# Patient Record
Sex: FEMALE | Race: WHITE
[De-identification: names, ages, dates, MRNs, and addresses within clinical notes are randomized per-mention and may not be internally consistent; named-entity substitution may affect disease eponyms.]

---

## 2017-03-08 ENCOUNTER — HOSPITAL ENCOUNTER (INPATIENT)
Dept: HOSPITAL 11 - JP.OBCHECK | Age: 32
Discharge: HOME | DRG: 560 | End: 2017-03-08
Attending: ADVANCED PRACTICE MIDWIFE | Admitting: ADVANCED PRACTICE MIDWIFE
Payer: COMMERCIAL

## 2017-03-08 VITALS — SYSTOLIC BLOOD PRESSURE: 133 MMHG | DIASTOLIC BLOOD PRESSURE: 76 MMHG

## 2017-03-08 DIAGNOSIS — O36.5990: ICD-10-CM

## 2017-03-08 DIAGNOSIS — O99.330: ICD-10-CM

## 2017-03-08 DIAGNOSIS — Z3A.36: ICD-10-CM

## 2017-03-08 DIAGNOSIS — O24.410: ICD-10-CM

## 2017-03-08 NOTE — PCM.DEL
L & D Note





- General Info


Date of Service: 17 (delivery)


Mother's Due Date: 17





- Delivery Note


Labor: spontaneous


Delivery Outcome: Livebirth


Infant Delivery Method: Spontaneous Vaginal Delivery


Birth Presentation: Vertex


Nuchal cord: none


Anesthesia Type: None


Amniotic Fluid Description: Clear


Episiotomy Type: None


Laceration: none


Placenta: intact, spontaneous


Cord: 3 vessels


Estimated blood loss: 100


Resuscitation needed: Yes


[place]: bulb syringe, stimulated, warmed, warmer used


 Provider: Daphne Green


APGAR Score 1 min: 6


APGAR Score 5 min: 8


APGAR Score 10 min: 8


Second Stage Interventions: Reports: Encouragement Given, Pushing Effectively


Delivery Comments (Free Text/Narrative):: 


This 31 year old G1 now P1 delivered a  viable female over an intact 

perineum at 1131 in ALOK position. The cord was clamped and cut and given to the 

pedestrian she was starting to cry at the time of delivery. weight 3.2 pounds 

1441 grams. three vessel cord. Apgars 6,8,8. PPV on and off times 1 minute, she 

actually transitioned well with normal for age O2 sats.


Placenta was expressed spontaneously intactmarjorie. No lacerations of cervix, 

vagina,rectum or perineum, placenta to pathology


EBL 100cc


Mother to post partum and baby to one to one nursing times 24 hours.


First stage [id number]


Second stage [id number]


Third stage [id number]








- General Info


Date of Service: 17


Admission Dx/Problem (Free Text): 


 Patient Status Order with Admit Dx/Problem





17 12:03


Patient Status [ADT] Routine 


   Patient Status: Admit to Inpatient


   Admission Diagnosis/Problem: Pregnancy


   Reason for Admit: labor


   Nurse Unit Type: Labor and Delivery


   Admitting Physician: Daphne Green


   Attending Physician: Daphne Green








 Admission Diagnosis/Problem











Admission Diagnosis/Problem    Pregnancy














Functional Status: Reports: pain controlled





- Review of Systems


General: Reports: no symptoms


HEENT: Reports: no symptoms


Pulmonary: Reports: no symptoms


Cardiovascular: Reports: no symptoms


Gastrointestinal: Reports: No symptoms


Genitourinary: Reports: no symptoms


Musculoskeletal: Reports: no symptoms


Skin: Reports: no symptoms


Neurological: Reports: no symptoms


Psychiatric: Reports: no symptoms





- Patient Data


Weight - most recent: 163 lb


Lab Results last 24 hrs: 


 Laboratory Results - last 24 hr











  17 Range/Units





  10:02 10:23 11:02 


 


WBC  15.3 H    (4.5-11.0)  K/uL


 


RBC  4.90    (3.30-5.50)  M/uL


 


Hgb  15.8 H    (12.0-15.0)  g/dL


 


Hct  45.1    (36.0-48.0)  %


 


MCV  92    (80-98)  fL


 


MCH  32 H    (27-31)  pg


 


MCHC  35    (32-36)  %


 


Plt Count  166    (150-400)  K/uL


 


Uric Acid    4.7  (2.6-6.2)  mg/dL


 


Total Bilirubin     (0.2-1.0)  mg/dL


 


Direct Bilirubin     (0.0-0.2)  mg/dL


 


Indirect Bilirubin     


 


AST     (15-37)  U/L


 


ALT     (12-78)  U/L


 


Alkaline Phosphatase     ()  U/L


 


Creatine Kinase    45  ()  U/L


 


Total Protein     (6.4-8.2)  g/dL


 


Albumin     (3.4-5.0)  g/dL


 


Globulin     (2.3-3.5)  g/dL


 


Albumin/Globulin Ratio     (1.2-2.2)  


 


Urine Color   Yellow   


 


Urine Appearance   Slightly cloudy   


 


Urine pH   7.0   (4.5-8.0)  


 


Ur Specific Gravity   1.010   (1.008-1.030)  


 


Urine Protein   Trace   (NEGATIVE)  mg/dL


 


Urine Glucose (UA)   Normal   (NEGATIVE)  mg/dL


 


Urine Ketones   Negative   (NEGATIVE)  mg/dL


 


Urine Occult Blood   Negative   (NEGATIVE)  


 


Urine Nitrite   Negative   (NEGATIVE)  


 


Urine Bilirubin   Negative   (NEGATIVE)  


 


Urine Urobilinogen   Normal   (NORMAL)  mg/dL


 


Ur Leukocyte Esterase   Negative   (NEGATIVE)  


 


Urine RBC   Not seen   (0-5)  


 


Urine WBC   Not seen   (0-5)  


 


Ur Epithelial Cells   Moderate   


 


Amorphous Sediment   Few   


 


Urine Bacteria   Few   


 


Urine Mucus   Moderate   














  17 Range/Units





  11:10 


 


WBC   (4.5-11.0)  K/uL


 


RBC   (3.30-5.50)  M/uL


 


Hgb   (12.0-15.0)  g/dL


 


Hct   (36.0-48.0)  %


 


MCV   (80-98)  fL


 


MCH   (27-31)  pg


 


MCHC   (32-36)  %


 


Plt Count   (150-400)  K/uL


 


Uric Acid   (2.6-6.2)  mg/dL


 


Total Bilirubin  0.2  (0.2-1.0)  mg/dL


 


Direct Bilirubin  0.08  (0.0-0.2)  mg/dL


 


Indirect Bilirubin  TNP  


 


AST  21  (15-37)  U/L


 


ALT  16  (12-78)  U/L


 


Alkaline Phosphatase  95  ()  U/L


 


Creatine Kinase   ()  U/L


 


Total Protein  6.7  (6.4-8.2)  g/dL


 


Albumin  3.0 L  (3.4-5.0)  g/dL


 


Globulin  3.7 H  (2.3-3.5)  g/dL


 


Albumin/Globulin Ratio  0.8 L  (1.2-2.2)  


 


Urine Color   


 


Urine Appearance   


 


Urine pH   (4.5-8.0)  


 


Ur Specific Gravity   (1.008-1.030)  


 


Urine Protein   (NEGATIVE)  mg/dL


 


Urine Glucose (UA)   (NEGATIVE)  mg/dL


 


Urine Ketones   (NEGATIVE)  mg/dL


 


Urine Occult Blood   (NEGATIVE)  


 


Urine Nitrite   (NEGATIVE)  


 


Urine Bilirubin   (NEGATIVE)  


 


Urine Urobilinogen   (NORMAL)  mg/dL


 


Ur Leukocyte Esterase   (NEGATIVE)  


 


Urine RBC   (0-5)  


 


Urine WBC   (0-5)  


 


Ur Epithelial Cells   


 


Amorphous Sediment   


 


Urine Bacteria   


 


Urine Mucus   











Med Orders - Current: 


 Current Medications





Acetaminophen (Tylenol)  650 mg PO Q4H PRN


   PRN Reason: mild pain or fever


Emollient Ointment (Lansinoh Hpa)  0 gm TOP ASDIRECTED PRN


   PRN Reason: Sore Nipples


Lactated Ringer's (Ringers, Lactated)  1,000 mls @ 100 mls/hr IV ASDIRECTED [facility]


   Last Admin: 17 10:43 Dose:  100 mls/hr


Penicillin G Potassium 2.5 (millunits/ Sodium Chloride)  50 mls @ 100 mls/hr IV 

Q4H [facility]


Oxytocin/Sodium Chloride (Pitocin In Ns 20 Units/1,000 Ml)  20 unit in 1,000 

mls @ 500 mls/hr IV ASDIRECTED [facility]


   PRN Reason: Protocol


Oxytocin/Sodium Chloride (Pitocin In Ns 20 Units/1,000 Ml)  20 unit in 1,000 

mls @ 999 mls/hr IV ONETIME ONE


   PRN Reason: Protocol


   Stop: 17 13:30


Ondansetron HCl (Zofran)  4 mg IVPUSH Q4H PRN


   PRN Reason: Nausea/Vomiting


   Last Admin: 17 10:42 Dose:  4 mg


Oxycodone/Acetaminophen (Percocet 325-5 Mg)  1 tab PO Q4H PRN


   PRN Reason: Pain (moderate 4-6)





Discontinued Medications





Penicillin G Potassium 5 (millunits/ Sodium Chloride)  100 mls @ 100 mls/hr IV 

ONETIME ONE


   Stop: 17 11:59


   Last Admin: 17 10:43 Dose:  100 mls/hr











- Exam


General: alert, oriented


HEENT: Pupils equal, Pupils reactive


Neck: supple


Lungs: Clear to auscultation, Normal respiratory effort


Cardiovascular: regular rate, regular rhythm


Abdomen: bowel sounds present, soft, no tenderness


 (Female) Exam: Normal external exam, Normal speculum exam


Back Exam: normal inspection, full range of motion


Extremities: no edema


Skin: warm, dry


Wound/Incisions: healing well


Neurological: no new focal deficit


Psy/Mental Status: alert, normal affect, normal mood





- Problem List & Annotations


(1) GDM (gestational diabetes mellitus), class A1


SNOMED Code(s): 40183537


   Code(s): O24.410 - GESTATIONAL DIABETES MELLITUS IN PREGNANCY, DIET 

CONTROLLED   Status: Acute   Current Visit: Yes   





(2) IUGR (intrauterine growth retardation) of 


SNOMED Code(s): 84998995, 20313920


   Code(s): P05.9 -  AFFECTED BY SLOW INTRAUTERINE GROWTH, UNSPECIFIED   

Status: Acute   Current Visit: No   





- Problem List Review


Problem List Initiated/Reviewed/Updated: Yes





- My Orders


Last 24 Hours: 


My Active Orders





17 09:40


OB Check [OM.PC] Click to Edit 





17 10:00


Lactated Ringers [Ringers, Lactated] 1,000 ml IV ASDIRECTED 





17 10:26


Ondansetron [Zofran]   4 mg IVPUSH Q4H PRN 





17 10:30


Oxytocin/Normal Saline [Pitocin in NS 20 Units/1,000 ML] 20 unit in 1,000 ml IV 

ASDIRECTED 





17 12:02


May Shower [RC] ASDIRECTED 


Up ad [place] [RC] ASDIRECTED 


Acetaminophen [Tylenol]   650 mg PO Q4H PRN 


Acetaminophen/oxyCODONE [Percocet 325-5 MG]   1 tab PO Q4H PRN 


Lanolin [Lansinoh HPA]   1 gm TOP ASDIRECTED PRN 


Assess Lochia [WOMSER] Per Unit Routine 


Assess Uterine Involution [WOMSER] Per Unit Routine 


DVT/VTE Prophylaxis Reflex [OM.PC] Routine 


Resuscitation Status Routine 





17 12:03


Patient Status [ADT] Routine 


Vital Signs [RC] PFP 





17 12:04


Ice Therapy [OM.PC] Per Unit Routine 


Perineal Care [OM.PC] Per Unit Routine 


Peripheral IV Discontinue [OM.PC] Routine 


Sitz Bath [OM.PC] Per Unit Routine 





17 12:05


Antiembolic Devices [RC] .Routine 


VTE/DVT Education [RC] Click to Edit 





17 12:09


Oxytocin/Normal Saline [Pitocin in NS 20 Units/1,000 ML] 20 unit in 1,000 ml IV 

ONETIME 





17 15:00


Penicillin G Potassium [Pfizerpen] 2.5 millunits   Sodium Chloride 0.9% [Normal 

Saline] 50 ml IV Q4H 





17 Lunch


Regular Diet [DIET] 





17 05:11


CBC WITH AUTO DIFF [HEME] AM 














- Assessment


Assessment:: 


3/8/17


This 31 year old G1 now P1   1141 gm female infant


IUGR


GDM


Smoker








- Plan


Plan:: 


Monitor for active labor


start pencillin for unknown GBS


Run IV LR at 100cc/hr


Draw PI labs


prepare for  delivery


---------------------------------------


3/8/17


Routine cares


Check am CBC


monitor blood pressures


may eat regular diet


48 hour stay, unknown GBS


support breastfeeding


ABO A pos


Rubella Immune

## 2017-03-08 NOTE — PCM.LDHP
L&D History of Present Illness





- General


Date of Service: 17 (labor)


Admit Problem/Dx: 


 Patient Status Order with Admit Dx/Problem





17 12:03


Patient Status [ADT] Routine 


   Patient Status: Admit to Inpatient


   Admission Diagnosis/Problem: Pregnancy


   Reason for Admit: labor


   Nurse Unit Type: Labor and Delivery


   Admitting Physician: Daphne Green


   Attending Physician: Daphne Green








 Admission Diagnosis/Problem











Admission Diagnosis/Problem    Pregnancy














Source of Information: Patient


History Limitations: Reports: No limitations





- History of Present Illness


Introduction:: 


This 31 year old G1 who is 36 1/7 weeks gestation present to clinic for follow 

up on BPP nad concerns for IUGR. Has GDM well controlled and is a smoker. 


Had been marie since early AM and contractions are painful. Ultrasound 

showed fetus to be in the 10% for growth measures 6 weeks less than gestational 

age. 


On cervical exam 375/0 marie every 3-4 monitor for labor.


Discussed case with Dr. Bolton and she will attend birth as needed.


GBS swab done at clinic. Will cover with antibiotics.





Timing/Duration: Reports: minutes: (3-4)


Location, Pregnancy: Reports: Lower back


Quality: Reports: Ache, Pressure


Severity: severe


Improves with: Reports: None


Worsens with: Reports: None





- Related Data


Allergies/Adverse Reactions: 


 Allergies











Allergy/AdvReac Type Severity Reaction Status Date / Time


 


No Known Allergies Allergy   Verified 17 09:37











Home Medications: 


 Home Meds





Cinnamon Bark [Cinnamon] 500 mg PO DAILY 17 [History]


Ergocalciferol (Vitamin D2) [Vitamin D] 400 unit PO DAILY 17 [History]


PNV95/Ferrous Fumarate/FA [Prenavite Tablet] 1 each PO DAILY 17 [History]











Past Medical History


OB/GYN History: Reports: Polycystic Ovaries, Pregnancy


: 1


Para: 0


LMP (Approximate): Pregnant (CECILIA 17)


Endocrine/Metabolic History: Reports: Diabetes, gestational





- Past Surgical History


HEENT Surgical History: Reports: Adenoidectomy, Tonsillectomy


Other Respiratory Surgeries/Procedures: smoker; 3-4 cigs/day





H&P Review of Systems





- Review of Systems:


Review Of Systems: See Below


General: Reports: no symptoms


HEENT: Reports: no symptoms


Pulmonary: Reports: no symptoms


Cardiovascular: Reports: no symptoms


Gastrointestinal: Reports: No symptoms, Other (blood pressure up and being 

monitored)


Genitourinary: Reports: no symptoms


Musculoskeletal: Reports: no symptoms


Skin: Reports: no symptoms


Psychiatric: Reports: no symptoms


Neurological: Reports: no symptoms


Hematologic/Lymphatic: Reports: no symptoms


Immunologic: Reports: no symptoms





L&D Exam





- Exam


Exam: See Below





- Vital Signs


Weight: 163 lb





- OB Specific


Contraction Intensity: Strong


Fetal movement: active


Fetal heart tones: present


Fetal heart tones per min: 140


Birth Presentation: Vertex


Estimated Fetal Weight: 3 pounds





- Justice Score


Justice Score Cervix Position: Anterior


Justice Score Consistency: Soft


Justice Score Effacement: 51-70%


Justice Score Dilation: 3-4 cm


Justice Score Infant's Station: -1 ,0


Justice Score Total: 10





- Exam


General: alert, oriented


HEENT: PERRLA, Conjunctiva clear, Mucosa moist & pink


Neck: supple


Lungs: Clear to auscultation, Normal respiratory effort


Cardiovascular: regular rate, regular rhythm


Abdomen: normal bowel sounds, soft


Rectal Exam: Normal exam


Genitourinary: Normal external exam


Back Exam: normal inspection, full range of motion


Extremities: normal inspection


Skin: warm, dry, intact


Neurological: cranial nerves intact, reflexes equal bilateral


Psychiatric: alert, normal affect, normal mood





- Patient Data


Lab Results last 24 hrs: 


 Laboratory Results - last 24 hr











  17 Range/Units





  10:02 10:23 11:02 


 


WBC  15.3 H    (4.5-11.0)  K/uL


 


RBC  4.90    (3.30-5.50)  M/uL


 


Hgb  15.8 H    (12.0-15.0)  g/dL


 


Hct  45.1    (36.0-48.0)  %


 


MCV  92    (80-98)  fL


 


MCH  32 H    (27-31)  pg


 


MCHC  35    (32-36)  %


 


Plt Count  166    (150-400)  K/uL


 


Uric Acid    4.7  (2.6-6.2)  mg/dL


 


Total Bilirubin     (0.2-1.0)  mg/dL


 


Direct Bilirubin     (0.0-0.2)  mg/dL


 


Indirect Bilirubin     


 


AST     (15-37)  U/L


 


ALT     (12-78)  U/L


 


Alkaline Phosphatase     ()  U/L


 


Creatine Kinase    45  ()  U/L


 


Total Protein     (6.4-8.2)  g/dL


 


Albumin     (3.4-5.0)  g/dL


 


Globulin     (2.3-3.5)  g/dL


 


Albumin/Globulin Ratio     (1.2-2.2)  


 


Urine Color   Yellow   


 


Urine Appearance   Slightly cloudy   


 


Urine pH   7.0   (4.5-8.0)  


 


Ur Specific Gravity   1.010   (1.008-1.030)  


 


Urine Protein   Trace   (NEGATIVE)  mg/dL


 


Urine Glucose (UA)   Normal   (NEGATIVE)  mg/dL


 


Urine Ketones   Negative   (NEGATIVE)  mg/dL


 


Urine Occult Blood   Negative   (NEGATIVE)  


 


Urine Nitrite   Negative   (NEGATIVE)  


 


Urine Bilirubin   Negative   (NEGATIVE)  


 


Urine Urobilinogen   Normal   (NORMAL)  mg/dL


 


Ur Leukocyte Esterase   Negative   (NEGATIVE)  


 


Urine RBC   Not seen   (0-5)  


 


Urine WBC   Not seen   (0-5)  


 


Ur Epithelial Cells   Moderate   


 


Amorphous Sediment   Few   


 


Urine Bacteria   Few   


 


Urine Mucus   Moderate   














  // Range/Units





  11:10 


 


WBC   (4.5-11.0)  K/uL


 


RBC   (3.30-5.50)  M/uL


 


Hgb   (12.0-15.0)  g/dL


 


Hct   (36.0-48.0)  %


 


MCV   (80-98)  fL


 


MCH   (27-31)  pg


 


MCHC   (32-36)  %


 


Plt Count   (150-400)  K/uL


 


Uric Acid   (2.6-6.2)  mg/dL


 


Total Bilirubin  0.2  (0.2-1.0)  mg/dL


 


Direct Bilirubin  0.08  (0.0-0.2)  mg/dL


 


Indirect Bilirubin  TNP  


 


AST  21  (15-37)  U/L


 


ALT  16  (12-78)  U/L


 


Alkaline Phosphatase  95  ()  U/L


 


Creatine Kinase   ()  U/L


 


Total Protein  6.7  (6.4-8.2)  g/dL


 


Albumin  3.0 L  (3.4-5.0)  g/dL


 


Globulin  3.7 H  (2.3-3.5)  g/dL


 


Albumin/Globulin Ratio  0.8 L  (1.2-2.2)  


 


Urine Color   


 


Urine Appearance   


 


Urine pH   (4.5-8.0)  


 


Ur Specific Gravity   (1.008-1.030)  


 


Urine Protein   (NEGATIVE)  mg/dL


 


Urine Glucose (UA)   (NEGATIVE)  mg/dL


 


Urine Ketones   (NEGATIVE)  mg/dL


 


Urine Occult Blood   (NEGATIVE)  


 


Urine Nitrite   (NEGATIVE)  


 


Urine Bilirubin   (NEGATIVE)  


 


Urine Urobilinogen   (NORMAL)  mg/dL


 


Ur Leukocyte Esterase   (NEGATIVE)  


 


Urine RBC   (0-5)  


 


Urine WBC   (0-5)  


 


Ur Epithelial Cells   


 


Amorphous Sediment   


 


Urine Bacteria   


 


Urine Mucus   











Result Diagrams: 


 17 10:02








- Problem List


(1) GDM (gestational diabetes mellitus), class A1


SNOMED Code(s): 69225987


   ICD Code: O24.410 - GESTATIONAL DIABETES MELLITUS IN PREGNANCY, DIET 

CONTROLLED   Status: Acute   Current Visit: Yes   





(2) IUGR (intrauterine growth retardation) of 


SNOMED Code(s): 76324555, 54932026


   ICD Code: P05.9 -  AFFECTED BY SLOW INTRAUTERINE GROWTH, UNSPECIFIED 

  Status: Acute   Current Visit: No   


Problem List Initiated/Reviewed/Updated: Yes


Orders Last 24hrs: 


 Active Orders 24 hr











 Category Date Time Status


 


 Patient Status [ADT] Routine ADT  17 12:03 Ordered


 


 Antiembolic Devices [RC] .Routine Care  17 12:05 Ordered


 


 May Shower [RC] ASDIRECTED Care  17 12:02 Ordered


 


 OB Check [OM.PC] Click to Edit Care  17 09:40 Ordered


 


 Up ad Esperanza [RC] ASDIRECTED Care  17 12:02 Ordered


 


 VTE/DVT Education [RC] Click to Edit Care  17 12:05 Ordered


 


 Vital Signs [RC] PFP Care  17 12:03 Ordered


 


 Regular Diet [DIET] Diet  17 Lunch Ordered


 


 CBC WITH AUTO DIFF [HEME] AM Lab  17 05:11 Ordered


 


 Acetaminophen [Tylenol] Med  17 12:02 Ordered





 650 mg PO Q4H PRN   


 


 Acetaminophen/oxyCODONE [Percocet 325-5 MG] Med  17 12:02 Ordered





 1 tab PO Q4H PRN   


 


 Lactated Ringers [Ringers, Lactated] 1,000 ml Med  17 10:00 Active





 IV ASDIRECTED   


 


 Lanolin [Lansinoh HPA] Med  17 12:02 Ordered





 1 gm TOP ASDIRECTED PRN   


 


 Ondansetron [Zofran] Med  17 10:26 Active





 4 mg IVPUSH Q4H PRN   


 


 Oxytocin/Normal Saline [Pitocin in NS 20 Units/1,000 ML Med  17 10:30 

Active





 ]   





 20 unit in 1,000 ml IV ASDIRECTED   


 


 Oxytocin/Normal Saline [Pitocin in NS 20 Units/1,000 ML Med  17 12:09 

Ordered





 ]   





 20 unit in 1,000 ml IV ONETIME   


 


 Penicillin G Potassium [Pfizerpen] 2.5 millunits Med  17 15:00 Active





 Sodium Chloride 0.9% [Normal Saline] 50 ml   





 IV Q4H   


 


 Assess Lochia [WOMSER] Per Unit Routine Ot  17 12:02 Ordered


 


 Assess Uterine Involution [WOMSER] Per Unit Routine Ot  17 12:02 Ordered


 


 DVT/VTE Prophylaxis Reflex [OM.PC] Routine Ot  17 12:02 Ordered


 


 Ice Therapy [OM.PC] Per Unit Routine Ot  17 12:04 Ordered


 


 Perineal Care [OM.PC] Per Unit Routine Ot  17 12:04 Ordered


 


 Peripheral IV Discontinue [OM.PC] Routine Ot  17 12:04 Ordered


 


 Sitz Bath [OM.PC] Per Unit Routine Ot  17 12:04 Ordered


 


 Resuscitation Status Routine Resus Stat  17 12:02 Ordered








 Medication Orders





Acetaminophen (Tylenol)  650 mg PO Q4H PRN


   PRN Reason: mild pain or fever


Emollient Ointment (Lansinoh Hpa)  1 gm TOP ASDIRECTED PRN


   PRN Reason: Sore Nipples


Lactated Ringer's (Ringers, Lactated)  1,000 mls @ 100 mls/hr IV ASDIRECTED [facility]


   Last Admin: 17 10:43  Dose: 100 mls/hr


Penicillin G Potassium 2.5 (millunits/ Sodium Chloride)  50 mls @ 100 mls/hr IV 

Q4H [facility]


Oxytocin/Sodium Chloride (Pitocin In Ns 20 Units/1,000 Ml)  20 unit in 1,000 

mls @ 500 mls/hr IV ASDIRECTED [facility]


   PRN Reason: Protocol


Oxytocin/Sodium Chloride (Pitocin In Ns 20 Units/1,000 Ml)  20 unit in 1,000 

mls @ 999 mls/hr IV ONETIME ONE


   PRN Reason: Protocol


   Stop: 17 13:09


Ondansetron HCl (Zofran)  4 mg IVPUSH Q4H PRN


   PRN Reason: Nausea/Vomiting


   Last Admin: 17 10:42  Dose: 4 mg


Oxycodone/Acetaminophen (Percocet 325-5 Mg)  1 tab PO Q4H PRN


   PRN Reason: Pain (moderate 4-6)








Assessment/Plan Comment:: 


Monitor for active labor


start pencillin for unknown GBS


Run IV LR at 100cc/hr


Draw [facility] labs


prepare for  delivery

## 2017-03-08 NOTE — PCM.PNPP
- General Info


Date of Service: 17 (discharge)


Admission Dx/Problem (Free Text): 


 Patient Status Order with Admit Dx/Problem





17 12:03


Patient Status [ADT] Routine 


   Patient Status: Admit to Inpatient


   Admission Diagnosis/Problem: Pregnancy


   Reason for Admit: labor


   Nurse Unit Type: Labor and Delivery


   Admitting Physician: Daphne Green


   Attending Physician: Daphne Green








 Admission Diagnosis/Problem











Admission Diagnosis/Problem    Pregnancy














Functional Status: Reports: pain controlled





- Review of Systems


General: Reports: no symptoms


HEENT: Reports: no symptoms


Pulmonary: Reports: no symptoms


Cardiovascular: Reports: no symptoms


Gastrointestinal: Reports: No symptoms


Genitourinary: Reports: no symptoms


Musculoskeletal: Reports: no symptoms


Skin: Reports: no symptoms


Neurological: Reports: no symptoms


Psychiatric: Reports: no symptoms





- General Info


Date of Service: 17





- Patient Data


Vital Signs - most recent: 


 Last Vital Signs











Temp  97.6 F   17 15:30


 


Pulse  84   17 15:30


 


Resp  16   17 15:30


 


BP  129/77   17 15:30


 


Pulse Ox  95   17 15:30











Weight - most recent: 163 lb


I&O - last 24 hours: 


 Intake & Output











 17





 06:59 14:59 22:59


 


Intake Total  1100 


 


Output Total   400


 


Balance  1100 -400











Lab Results - last 24 hrs: 


 Laboratory Results - last 24 hr











  17 Range/Units





  10:02 10:23 11:02 


 


WBC  15.3 H    (4.5-11.0)  K/uL


 


RBC  4.90    (3.30-5.50)  M/uL


 


Hgb  15.8 H    (12.0-15.0)  g/dL


 


Hct  45.1    (36.0-48.0)  %


 


MCV  92    (80-98)  fL


 


MCH  32 H    (27-31)  pg


 


MCHC  35    (32-36)  %


 


Plt Count  166    (150-400)  K/uL


 


Uric Acid    4.7  (2.6-6.2)  mg/dL


 


Total Bilirubin     (0.2-1.0)  mg/dL


 


Direct Bilirubin     (0.0-0.2)  mg/dL


 


Indirect Bilirubin     


 


AST     (15-37)  U/L


 


ALT     (12-78)  U/L


 


Alkaline Phosphatase     ()  U/L


 


Creatine Kinase    45  ()  U/L


 


Total Protein     (6.4-8.2)  g/dL


 


Albumin     (3.4-5.0)  g/dL


 


Globulin     (2.3-3.5)  g/dL


 


Albumin/Globulin Ratio     (1.2-2.2)  


 


Urine Color   Yellow   


 


Urine Appearance   Slightly cloudy   


 


Urine pH   7.0   (4.5-8.0)  


 


Ur Specific Gravity   1.010   (1.008-1.030)  


 


Urine Protein   Trace   (NEGATIVE)  mg/dL


 


Urine Glucose (UA)   Normal   (NEGATIVE)  mg/dL


 


Urine Ketones   Negative   (NEGATIVE)  mg/dL


 


Urine Occult Blood   Negative   (NEGATIVE)  


 


Urine Nitrite   Negative   (NEGATIVE)  


 


Urine Bilirubin   Negative   (NEGATIVE)  


 


Urine Urobilinogen   Normal   (NORMAL)  mg/dL


 


Ur Leukocyte Esterase   Negative   (NEGATIVE)  


 


Urine RBC   Not seen   (0-5)  


 


Urine WBC   Not seen   (0-5)  


 


Ur Epithelial Cells   Moderate   


 


Amorphous Sediment   Few   


 


Urine Bacteria   Few   


 


Urine Mucus   Moderate   


 


Urine Opiates Screen     (NEGATIVE)  


 


Ur Oxycodone Screen     (NEGATIVE)  


 


Urine Methadone Screen     (NEGATIVE)  


 


Ur Propoxyphene Screen     (NEGATIVE)  


 


Ur Barbiturates Screen     (NEGATIVE)  


 


Ur Tricyclics Screen     (NEGATIVE)  


 


Ur Phencyclidine Scrn     (NEGATIVE)  


 


Ur Amphetamine Screen     (NEGATIVE)  


 


U Methamphetamines Scrn     (NEGATIVE)  


 


Urine MDMA Screen     (NEGATIVE)  


 


U Benzodiazepines Scrn     (NEGATIVE)  


 


U Cocaine Metab Screen     (NEGATIVE)  


 


U Marijuana (THC) Screen     (NEGATIVE)  














  17 Range/Units





  11:10 12:33 


 


WBC    (4.5-11.0)  K/uL


 


RBC    (3.30-5.50)  M/uL


 


Hgb    (12.0-15.0)  g/dL


 


Hct    (36.0-48.0)  %


 


MCV    (80-98)  fL


 


MCH    (27-31)  pg


 


MCHC    (32-36)  %


 


Plt Count    (150-400)  K/uL


 


Uric Acid    (2.6-6.2)  mg/dL


 


Total Bilirubin  0.2   (0.2-1.0)  mg/dL


 


Direct Bilirubin  0.08   (0.0-0.2)  mg/dL


 


Indirect Bilirubin  TNP   


 


AST  21   (15-37)  U/L


 


ALT  16   (12-78)  U/L


 


Alkaline Phosphatase  95   ()  U/L


 


Creatine Kinase    ()  U/L


 


Total Protein  6.7   (6.4-8.2)  g/dL


 


Albumin  3.0 L   (3.4-5.0)  g/dL


 


Globulin  3.7 H   (2.3-3.5)  g/dL


 


Albumin/Globulin Ratio  0.8 L   (1.2-2.2)  


 


Urine Color    


 


Urine Appearance    


 


Urine pH    (4.5-8.0)  


 


Ur Specific Gravity    (1.008-1.030)  


 


Urine Protein    (NEGATIVE)  mg/dL


 


Urine Glucose (UA)    (NEGATIVE)  mg/dL


 


Urine Ketones    (NEGATIVE)  mg/dL


 


Urine Occult Blood    (NEGATIVE)  


 


Urine Nitrite    (NEGATIVE)  


 


Urine Bilirubin    (NEGATIVE)  


 


Urine Urobilinogen    (NORMAL)  mg/dL


 


Ur Leukocyte Esterase    (NEGATIVE)  


 


Urine RBC    (0-5)  


 


Urine WBC    (0-5)  


 


Ur Epithelial Cells    


 


Amorphous Sediment    


 


Urine Bacteria    


 


Urine Mucus    


 


Urine Opiates Screen   Negative  (NEGATIVE)  


 


Ur Oxycodone Screen   Negative  (NEGATIVE)  


 


Urine Methadone Screen   Negative  (NEGATIVE)  


 


Ur Propoxyphene Screen   Negative  (NEGATIVE)  


 


Ur Barbiturates Screen   Negative  (NEGATIVE)  


 


Ur Tricyclics Screen   Negative  (NEGATIVE)  


 


Ur Phencyclidine Scrn   Negative  (NEGATIVE)  


 


Ur Amphetamine Screen   Negative  (NEGATIVE)  


 


U Methamphetamines Scrn   Negative  (NEGATIVE)  


 


Urine MDMA Screen   Negative  (NEGATIVE)  


 


U Benzodiazepines Scrn   Negative  (NEGATIVE)  


 


U Cocaine Metab Screen   Negative  (NEGATIVE)  


 


U Marijuana (THC) Screen   Negative  (NEGATIVE)  











Med Orders - Current: 


 Current Medications





Acetaminophen (Tylenol)  650 mg PO Q4H PRN


   PRN Reason: mild pain or fever


   Last Admin: 17 15:49 Dose:  650 mg


Emollient Ointment (Lansinoh Hpa)  0 gm TOP ASDIRECTED PRN


   PRN Reason: Sore Nipples


Lactated Ringer's (Ringers, Lactated)  1,000 mls @ 100 mls/hr IV ASDIRECTED BE


   Last Admin: 17 10:43 Dose:  100 mls/hr


Oxytocin/Sodium Chloride (Pitocin In Ns 20 Units/1,000 Ml)  20 unit in 1,000 

mls @ 500 mls/hr IV ASDIRECTED BE


   PRN Reason: Protocol


Ondansetron HCl (Zofran)  4 mg IVPUSH Q4H PRN


   PRN Reason: Nausea/Vomiting


   Last Admin: 17 10:42 Dose:  4 mg


Oxycodone/Acetaminophen (Percocet 325-5 Mg)  1 tab PO Q4H PRN


   PRN Reason: Pain (moderate 4-6)





Discontinued Medications





Penicillin G Potassium 5 (millunits/ Sodium Chloride)  100 mls @ 100 mls/hr IV 

ONETIME ONE


   Stop: 17 11:59


   Last Admin: 17 10:43 Dose:  100 mls/hr


Penicillin G Potassium 2.5 (millunits/ Sodium Chloride)  50 mls @ 100 mls/hr IV 

Q4H [facility]


   Last Admin: 17 16:02 Dose:  Not Given


Oxytocin/Sodium Chloride (Pitocin In Ns 20 Units/1,000 Ml)  20 unit in 1,000 

mls @ 999 mls/hr IV ONETIME ONE


   PRN Reason: Protocol


   Stop: 17 13:30


   Last Admin: 17 11:33 Dose:  999 mls/hr, 999 mls/hr











- Infant Interaction


Infant Disposition, Postpartum: [place] in Room with Family


Infant Interaction: Holding Infant


Infant Feeding: Bottle Fed Infant,  Infant; Nursed Well


Support Person: Significant Other





- Postpartum Recovery Exam


Fundal Tone: Firm


Fundal Level: At Umbilicus


Fundal Placement: Right


Lochia Amount: Small


Lochia Color: Rubra/Red


Perineum Description: Intact, Minimal Bruising/Swelling


Episiotomy/Laceration: None


Bladder Status: Voiding


Urinary Elimination: Voided





- Exam


General: alert, oriented


HEENT: Pupils equal


Neck: supple


Lungs: Clear to auscultation, Normal respiratory effort


Cardiovascular: regular rate, regular rhythm


Abdomen: bowel sounds present, soft, no tenderness, no distension


Extremities: no edema


Skin: warm, dry, intact


Wound/Incisions: healing well


Neurological: no new focal deficit


Psy/Mental Status: alert, normal affect, normal mood





- Problem List & Annotations


(1) GDM (gestational diabetes mellitus), class A1


SNOMED Code(s): 36761565


   Code(s): O24.410 - GESTATIONAL DIABETES MELLITUS IN PREGNANCY, DIET 

CONTROLLED   Status: Acute   Current Visit: Yes   





(2) IUGR (intrauterine growth retardation) of 


SNOMED Code(s): 11619676, 81608821


   Code(s): P05.9 -  AFFECTED BY SLOW INTRAUTERINE GROWTH, UNSPECIFIED   

Status: Acute   Current Visit: No   





- Problem List Review


Problem List Initiated/Reviewed/Updated: Yes





- My Orders


Last 24 Hours: 


My Active Orders





17 09:40


OB Check [OM.PC] Click To Edit 





17 10:00


Lactated Ringers [Ringers, Lactated] 1,000 ml IV ASDIRECTED 





17 10:26


Ondansetron [Zofran]   4 mg IVPUSH Q4H PRN 





17 10:30


Oxytocin/Normal Saline [Pitocin in NS 20 Units/1,000 ML] 20 unit in 1,000 ml IV 

ASDIRECTED 





17 12:02


May Shower [RC] ASDIRECTED 


Up ad [place] [RC] ASDIRECTED 


Acetaminophen [Tylenol]   650 mg PO Q4H PRN 


Acetaminophen/oxyCODONE [Percocet 325-5 MG]   1 tab PO Q4H PRN 


Lanolin [Lansinoh HPA]   0 gm TOP ASDIRECTED PRN 


Assess Lochia [WOMSER] Per Unit Routine 


Assess Uterine Involution [WOMSER] Per Unit Routine 


DVT/VTE Prophylaxis Reflex [OM.PC] Routine 


Resuscitation Status Routine 





17 12:03


Patient Status [ADT] Routine 


Vital Signs [RC] PFP 





17 12:04


Ice Therapy [OM.PC] Per Unit Routine 


Perineal Care [OM.PC] Per Unit Routine 


Peripheral IV Discontinue [OM.PC] Routine 


Sitz Bath [OM.PC] Per Unit Routine 





17 12:05


Antiembolic Devices [RC] .Routine 





17 Lunch


Regular Diet [DIET] 





17 05:11


CBC WITH AUTO DIFF [HEME] AM 














- Assessment


Assessment:: 


3/8/17


This 31 year old G1 now P1   1141 gm female infant


IUGR


GDM


Smoker


---------------------------------------


3/8/17


 for  delivery at 36 weeks for IUGR infant


Breast and bottle feeding








- Plan


Plan:: 


Monitor for active labor


start pencillin for unknown GBS


Run IV LR at 100cc/hr


Draw PI labs


prepare for  delivery


---------------------------------------


3/8/17


Routine cares


Check am CBC


monitor blood pressures


may eat regular diet


48 hour stay, unknown GBS


support breastfeeding


ABO A pos


Rubella Immune


--------------------------------


Discharge mom so she can be with her baby in [place]


She is stable and doing well


Blood pressures are normal. no fever, bleeding light


To see me in 6 weeks for a post partum visit

## 2017-09-01 ENCOUNTER — HOSPITAL ENCOUNTER (EMERGENCY)
Dept: HOSPITAL 11 - JP.ED | Age: 32
Discharge: HOME | End: 2017-09-01
Payer: COMMERCIAL

## 2017-09-01 VITALS — SYSTOLIC BLOOD PRESSURE: 150 MMHG | DIASTOLIC BLOOD PRESSURE: 94 MMHG
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
## 2017-09-01 DIAGNOSIS — Z79.899: ICD-10-CM

## 2017-09-01 DIAGNOSIS — Z98.890: ICD-10-CM

## 2017-09-01 DIAGNOSIS — S39.012A: Primary | ICD-10-CM

## 2017-09-01 DIAGNOSIS — F17.210: ICD-10-CM

## 2017-09-01 PROCEDURE — 96372 THER/PROPH/DIAG INJ SC/IM: CPT

## 2017-09-01 PROCEDURE — 99283 EMERGENCY DEPT VISIT LOW MDM: CPT

## 2017-09-01 NOTE — EDM.PDOC
ED HPI GENERAL MEDICAL PROBLEM





- General


Chief Complaint: Back Pain or Injury


Stated Complaint: WORK INJURY


Time Seen by Provider: 17 20:38


Source of Information: Reports: Patient





- History of Present Illness


INITIAL COMMENTS - FREE TEXT/NARRATIVE: 





Was helping move a patient in bed when he pulled her arm, twisting her back.  

Was working up on 2nd floor.  Incident occurred about 4:30pm.  Has not taken 

anything for pain. Rates pain a 6 to low back with radiation down to left leg 

to knee at the time of exam.  Also with shoulder tightness on the left side.  

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  No 

history of injury to back.  Does have a 5 month old at home.  Is not 

breastfeeding.


Onset: Today, Sudden


Onset Date: 17


Onset Time: 16:30


Duration: Constant


Location: Reports: Back


Quality: Reports: Ache, Burning


Severity: Moderate


Improves with: Reports: Rest


Worsens with: Reports: None


Context: Reports: Lifting


Associated Symptoms: Reports: No Other Symptoms


  ** Left Lower Back


Pain Score (Numeric/FACES): 6





- Related Data


 Allergies











Allergy/AdvReac Type Severity Reaction Status Date / Time


 


No Known Allergies Allergy   Verified 17 20:24











Home Meds: 


 Home Meds





*Bc Pills 1 tab PO DAILY 17 [History]











Past Medical History


OB/GYN History: Reports: Polycystic Ovaries, Pregnancy, Spontaneous 


Musculoskeletal History: Reports: Fracture


Other Musculoskeletal History: Bilateral arms


Endocrine/Metabolic History: Reports: Diabetes, Gestational





- Infectious Disease History


Infectious Disease History: Reports: Chicken Pox





- Past Surgical History


HEENT Surgical History: Reports: Adenoidectomy, Tonsillectomy


Other Respiratory Surgeries/Procedures: smoker; 3-4 cigs/day





Social & Family History





- Tobacco Use


Smoking Status *Q: Current Every Day Smoker


Years of Tobacco use: 10


Packs/Tins Daily: 0.5


Used Tobacco, but Quit: No


Second Hand Smoke Exposure: Yes





- Caffeine Use


Caffeine Use: Reports: Coffee, Soda, Tea


Other Caffeine Use: very little





- Recreational Drug Use


Recreational Drug Use: No





ED ROS GENERAL





- Review of Systems


Review Of Systems: See Below


Constitutional: Reports: No Symptoms


HEENT: Reports: No Symptoms


Respiratory: Reports: No Symptoms


Cardiovascular: Reports: No Symptoms


Musculoskeletal: Reports: Shoulder Pain, Back Pain


Skin: Reports: No Symptoms


Neurological: Reports: No Symptoms





ED EXAM,LOWER BACK PAIN/INJURY





- Physical Exam


Exam: See Below


Exam Limited By: No Limitations


General Appearance: Alert, WD/WN, No Apparent Distress


Respiratory/Chest: No Respiratory Distress, Lungs Clear, Normal Breath Sounds, 

No Accessory Muscle Use, Chest Non-Tender


Cardiovascular: Normal Peripheral Pulses, Regular Rate, Rhythm, No Edema, No 

Gallop, No JVD, No Murmur, No Rub


GI/Abdominal: Normal Bowel Sounds, Soft, Non-Tender, No Organomegaly, No 

Distention, No Abnormal Bruit, No Mass


Extremities: Normal Inspection, Normal Range of Motion, Non-Tender, No Pedal 

Edema, Normal Capillary Refill, Other (pain with palpation to left SI region.  )


Neurological: Alert, Normal Mood/Affect, Normal Dorsiflexion, CN II-XII Intact, 

Normal Plantar Flexion, Normal Gait, Normal Reflexes, No Motor/Sensory Deficits

, Oriented x 3, Straight Leg Raise (L) (normal), Straight Leg Raise (R)


Skin Exam: Warm, Dry, Intact, Normal Color, No Rash


Lymphatic: No Adenopathy





Course





- Vital Signs


Last Recorded V/S: 





 Last Vital Signs











Temp  97.7 F   17 20:33


 


Pulse  83   17 20:33


 


Resp  16   17 20:33


 


BP  150/94 H  17 20:33


 


Pulse Ox  97   17 20:33














Departure





- Departure


Time of Disposition: 20:50


Disposition: Home, Self-Care 01


Condition: Good


Clinical Impression: 


Back strain


Qualifiers:


 Encounter type: initial encounter Qualified Code(s): S39.012A - Strain of 

muscle, fascia and tendon of lower back, initial encounter








- Discharge Information


Instructions:  Muscle Strain, Easy-to-Read


Referrals: 


Daphne Green CNM [Primary Care Provider] - 


Additional Instructions: 


Discussed stretching exercises.  No need for xrays at this time.  Encouraged 

ice to back.  Ketorolac 60mg IM given in ER.  Pt to use Aleve 1-2 tabs with 

food twice daily as needed for inflammation.  Flexeril 10mg po TID as needed 

for pain/spasm.  Dose given in ER.  May return to work on Tuesday without 

restriction.  To return if pain not resolving.





- Problem List & Annotations


(1) Back strain


SNOMED Code(s): 955974663


   Code(s): S39.012A - STRAIN OF MUSCLE, FASCIA AND TENDON OF LOWER BACK, INIT 

  Status: Acute   Priority: Medium   Current Visit: Yes   


Qualifiers: 


   Encounter type: initial encounter   Qualified Code(s): S39.012A - Strain of 

muscle, fascia and tendon of lower back, initial encounter

## 2019-08-13 ENCOUNTER — HOSPITAL ENCOUNTER (OUTPATIENT)
Dept: HOSPITAL 11 - JP.OBCHECK | Age: 34
Setting detail: OBSERVATION
LOS: 1 days | Discharge: HOME | End: 2019-08-14
Attending: ADVANCED PRACTICE MIDWIFE | Admitting: ADVANCED PRACTICE MIDWIFE
Payer: COMMERCIAL

## 2019-08-13 DIAGNOSIS — F41.9: ICD-10-CM

## 2019-08-13 DIAGNOSIS — R03.0: ICD-10-CM

## 2019-08-13 DIAGNOSIS — O99.343: Primary | ICD-10-CM

## 2019-08-13 DIAGNOSIS — Z79.84: ICD-10-CM

## 2019-08-13 DIAGNOSIS — O24.410: ICD-10-CM

## 2019-08-13 DIAGNOSIS — Z3A.36: ICD-10-CM

## 2019-08-13 DIAGNOSIS — O09.219: ICD-10-CM

## 2019-08-13 PROCEDURE — 85025 COMPLETE CBC W/AUTO DIFF WBC: CPT

## 2019-08-13 PROCEDURE — 81001 URINALYSIS AUTO W/SCOPE: CPT

## 2019-08-13 PROCEDURE — 76818 FETAL BIOPHYS PROFILE W/NST: CPT

## 2019-08-13 PROCEDURE — 85027 COMPLETE CBC AUTOMATED: CPT

## 2019-08-13 PROCEDURE — 83615 LACTATE (LD) (LDH) ENZYME: CPT

## 2019-08-13 PROCEDURE — 36415 COLL VENOUS BLD VENIPUNCTURE: CPT

## 2019-08-13 PROCEDURE — 87081 CULTURE SCREEN ONLY: CPT

## 2019-08-13 PROCEDURE — 80053 COMPREHEN METABOLIC PANEL: CPT

## 2019-08-13 PROCEDURE — 96360 HYDRATION IV INFUSION INIT: CPT

## 2019-08-13 PROCEDURE — 76816 OB US FOLLOW-UP PER FETUS: CPT

## 2019-08-13 PROCEDURE — G0378 HOSPITAL OBSERVATION PER HR: HCPCS

## 2019-08-14 VITALS — HEART RATE: 81 BPM | DIASTOLIC BLOOD PRESSURE: 85 MMHG | SYSTOLIC BLOOD PRESSURE: 133 MMHG

## 2019-08-14 NOTE — CRLUS
INDICATION:



Evaluate fetal anatomy.  



COMPARISON:



none



TECHNIQUE:



Real time gray scale imaging of the fetus was performed as well as color 

Doppler analysis of the umbilical vessels.



FINDINGS:



Sonographic imaging demonstrates a single living intrauterine gestation.  

Fetus demonstrates a regular cardiac rate of 150 beats per minute.  Fetus 

has a cephalic orientation.  Amniotic fluid volume appears normal.  Single 

deepest vertical pocket: 3.5 cm with amniotic fluid index 10.88. The 

composite ultrasound gestational age is calculated at 35 weeks 1 day with 

an estimated sonographic due date of 09/17/2019. This is approximately 7 

days behind the due date by LMP which is 09/10/2019. The estimated fetal 

weight is 2555 grams.



The following biometric measurements were obtained:



Biparietal diameter:          8.63 cm/34 weeks 6 days 



Head circumference:         31.37 cm/35 weeks 2 days 



Abdominal circumference: 31.02 cm/35 weeks 0 days 



Femur length:                  6.79 cm/35 weeks 0 days 



The HC/AC ratio measures: 1.01 range (0.93-1.11) 



Limited fetal survey demonstrates a four-chamber heart is present. The 

stomach, kidneys, and bladder also appear normal.   



The fetus was active and demonstrated normal breathing movements. There was 

normal flexion and extension of the trunk and extremities. The fetus scored 

2 out of 2 in every category. 



IMPRESSION:



1. Normal OB ultrasound exam with near concordance of clinical and 

sonographic dating.  No intrinsic abnormalities noted on limited anatomic 

survey. The composite ultrasound gestational age is 35 weeks 1 day and 

estimated sonographic due date is 09/17/2019. 



2. Normal biophysical profile score 8 out of 8.



Dictated by Maggie Decker MD @ Aug 14 2019  9:06AM



Signed by Dr. Maggie Decker @ Aug 14 2019  9:25AM

## 2019-08-14 NOTE — PCM.LDHP
L&D History of Present Illness





- General


Date of Service: 19


Admit Problem/Dx: 


 Patient Status Order with Admit Dx/Problem





19 22:27


Admission Status [Patient Status] [ADT] Routine 








 Admission Diagnosis/Problem











Admission Diagnosis/Problem    Leukocytosis

















- Related Data


Allergies/Adverse Reactions: 


 Allergies











Allergy/AdvReac Type Severity Reaction Status Date / Time


 


No Known Allergies Allergy   Verified 17 20:24











Home Medications: 


 Home Meds





Prenat Vit Comb.10/Iron/Fa/Dha [Vitafol-OB + DHA] 1 each PO DAILY 19 [

History]


glyBURIDE [Micronase] 2.5 mg PO DAILY 19 [History]











Past Medical History


OB/GYN History: Reports: Polycystic Ovaries, Pregnancy, Spontaneous 


Musculoskeletal History: Reports: Fracture


Other Musculoskeletal History: Bilateral arms


Endocrine/Metabolic History: Reports: Diabetes, Gestational





- Infectious Disease History


Infectious Disease History: Reports: Chicken Pox





- Past Surgical History


HEENT Surgical History: Reports: Adenoidectomy, Tonsillectomy


Other Respiratory Surgeries/Procedures: smoker; 3-4 cigs/day





Social & Family History





- Caffeine Use


Caffeine Use: Reports: Coffee, Soda, Tea


Other Caffeine Use: very little





H&P Review of Systems





- Review of Systems:


Review Of Systems: See Below


General: Reports: No Symptoms


HEENT: Reports: No Symptoms


Pulmonary: Reports: No Symptoms


Cardiovascular: Reports: No Symptoms


Gastrointestinal: Reports: No Symptoms


Genitourinary: Reports: No Symptoms


Musculoskeletal: Reports: No Symptoms


Skin: Reports: No Symptoms


Psychiatric: Reports: No Symptoms


Neurological: Reports: No Symptoms


Hematologic/Lymphatic: Reports: No Symptoms


Immunologic: Reports: No Symptoms





L&D Exam





- Exam


Exam: See Below





- Vital Signs


Vital Signs: 


 Last Vital Signs











Temp  35.3 C   19 07:29


 


Pulse  81   19 07:29


 


Resp  16   19 07:29


 


BP  133/85   19 07:29


 


Pulse Ox  99   19 07:29














- OB Specific


Contraction Duration (sec): 40


Contraction Frequency (min): none


Contraction Intensity: Mild





- Exam


General: Alert, Oriented, Cooperative


HEENT: PERRLA, Conjunctiva Clear, EACs Clear, EOMI, Hearing Intact, Mucosa 

Moist & Pink, Nares Patent, Normal Nasal Septum, Posterior Pharynx Clear, TMs 

Clear


Neck: Supple, Trachea Midline


Lungs: Clear to Auscultation, Normal Respiratory Effort


Cardiovascular: Regular Rate, Regular Rhythm


GI/Abdominal Exam: Normal Bowel Sounds, Soft, Non-Tender, No Organomegaly, No 

Distention, No Abnormal Bruit, No Mass, Pelvis Stable


Rectal Exam: Normal Exam, Normal Rectal Tone


Genitourinary: Normal external exam, Normal bimanual exam, Normal speculum exam


Back Exam: Normal Inspection, Full Range of Motion


Extremities: Normal Inspection, Normal Range of Motion, Non-Tender, No Pedal 

Edema, Normal Capillary Refill


Skin: Warm, Dry, Intact


Neurological: Cranial Nerves Intact, Reflexes Equal Bilateral


Psychiatric: Alert, Normal Affect, Normal Mood





- Patient Data


Lab Results Last 24 hrs: 


 Laboratory Results - last 24 hr











  19 Range/Units





  21:02 21:20 21:40 


 


WBC   18.1 H  17.7 H  (4.5-11.0)  K/uL


 


RBC   4.53  4.65  (3.30-5.50)  M/uL


 


Hgb   14.5  14.4  (12.0-15.0)  g/dL


 


Hct   41.5  42.4  (36.0-48.0)  %


 


MCV   92  91  (80-98)  fL


 


MCH   32 H  31  (27-31)  pg


 


MCHC   35  34  (32-36)  %


 


Plt Count   151  159  (150-400)  K/uL


 


Neut % (Auto)   81 H   (36-66)  %


 


Lymph % (Auto)   9 L   (24-44)  %


 


Mono % (Auto)   9 H   (2-6)  %


 


Eos % (Auto)   1 L   (2-4)  %


 


Baso % (Auto)   0   (0-1)  %


 


Sodium     (140-148)  mmol/L


 


Potassium     (3.6-5.2)  mmol/L


 


Chloride     (100-108)  mmol/L


 


Carbon Dioxide     (21-32)  mmol/L


 


Anion Gap     (5.0-14.0)  mmol/L


 


BUN     (7-18)  mg/dL


 


Creatinine     (0.6-1.0)  mg/dL


 


Est Cr Clr Drug Dosing     


 


Estimated GFR (MDRD)     (>60)  


 


Glucose     ()  mg/dL


 


Calcium     (8.5-10.1)  mg/dL


 


Total Bilirubin     (0.2-1.0)  mg/dL


 


AST     (15-37)  U/L


 


ALT     (12-78)  U/L


 


Alkaline Phosphatase     ()  U/L


 


Lactate Dehydrogenase     ()  U/L


 


Total Protein     (6.4-8.2)  g/dL


 


Albumin     (3.4-5.0)  g/dL


 


Globulin     (2.3-3.5)  g/dL


 


Albumin/Globulin Ratio     (1.2-2.2)  


 


Urine Color  Yellow    (YELLOW)  


 


Urine Appearance  Clear    (CLEAR)  


 


Urine pH  7.0    (5.0-8.0)  


 


Ur Specific Gravity  1.015    (1.008-1.030)  


 


Urine Protein  Negative    (NEGATIVE)  mg/dL


 


Urine Glucose (UA)  Normal    (NEGATIVE)  mg/dL


 


Urine Ketones  Negative    (NEGATIVE)  mg/dL


 


Urine Occult Blood  Negative    (NEGATIVE)  


 


Urine Nitrite  Negative    (NEGATIVE)  


 


Urine Bilirubin  Negative    (NEGATIVE)  


 


Urine Urobilinogen  0.2    (0.2-1.0)  EU/dL


 


Ur Leukocyte Esterase  Negative    (NEGATIVE)  


 


Urine RBC  Not seen    (0-5)  


 


Urine WBC  0-5    (0-5)  


 


Ur Epithelial Cells  Rare    


 


Amorphous Sediment  Not seen    


 


Urine Bacteria  Not seen    


 


Urine Mucus  Not seen    














  19 Range/Units





  21:40 05:00 


 


WBC   14.7 H  (4.5-11.0)  K/uL


 


RBC   4.19  (3.30-5.50)  M/uL


 


Hgb   13.0  (12.0-15.0)  g/dL


 


Hct   38.9  (36.0-48.0)  %


 


MCV   93  (80-98)  fL


 


MCH   31  (27-31)  pg


 


MCHC   33  (32-36)  %


 


Plt Count   142 L  (150-400)  K/uL


 


Neut % (Auto)   75 H  (36-66)  %


 


Lymph % (Auto)   16 L  (24-44)  %


 


Mono % (Auto)   9 H  (2-6)  %


 


Eos % (Auto)   0 L  (2-4)  %


 


Baso % (Auto)   0  (0-1)  %


 


Sodium  141   (140-148)  mmol/L


 


Potassium  3.4 L   (3.6-5.2)  mmol/L


 


Chloride  105   (100-108)  mmol/L


 


Carbon Dioxide  25   (21-32)  mmol/L


 


Anion Gap  14.4 H   (5.0-14.0)  mmol/L


 


BUN  9   (7-18)  mg/dL


 


Creatinine  0.8   (0.6-1.0)  mg/dL


 


Est Cr Clr Drug Dosing  TNP   


 


Estimated GFR (MDRD)  > 60   (>60)  


 


Glucose  87   ()  mg/dL


 


Calcium  11.0 H   (8.5-10.1)  mg/dL


 


Total Bilirubin  0.4  D   (0.2-1.0)  mg/dL


 


AST  18   (15-37)  U/L


 


ALT  16   (12-78)  U/L


 


Alkaline Phosphatase  94   ()  U/L


 


Lactate Dehydrogenase  169   ()  U/L


 


Total Protein  7.2   (6.4-8.2)  g/dL


 


Albumin  3.0 L   (3.4-5.0)  g/dL


 


Globulin  4.2 H   (2.3-3.5)  g/dL


 


Albumin/Globulin Ratio  0.7 L   (1.2-2.2)  


 


Urine Color    (YELLOW)  


 


Urine Appearance    (CLEAR)  


 


Urine pH    (5.0-8.0)  


 


Ur Specific Gravity    (1.008-1.030)  


 


Urine Protein    (NEGATIVE)  mg/dL


 


Urine Glucose (UA)    (NEGATIVE)  mg/dL


 


Urine Ketones    (NEGATIVE)  mg/dL


 


Urine Occult Blood    (NEGATIVE)  


 


Urine Nitrite    (NEGATIVE)  


 


Urine Bilirubin    (NEGATIVE)  


 


Urine Urobilinogen    (0.2-1.0)  EU/dL


 


Ur Leukocyte Esterase    (NEGATIVE)  


 


Urine RBC    (0-5)  


 


Urine WBC    (0-5)  


 


Ur Epithelial Cells    


 


Amorphous Sediment    


 


Urine Bacteria    


 


Urine Mucus    











Result Diagrams: 


 19 05:00





 19 21:40





- Problem List


(1) Anxiety


SNOMED Code(s): 91453023


   ICD Code: F41.9 - ANXIETY DISORDER, UNSPECIFIED   Status: Acute   Current 

Visit: Yes   





(2) 36 weeks gestation of pregnancy


SNOMED Code(s): 27507798


   ICD Code: Z3A.36 - 36 WEEKS GESTATION OF PREGNANCY   Status: Acute   Current 

Visit: Yes   





(3) History of  delivery, currently pregnant


SNOMED Code(s): 206300424, 236624888


   ICD Code: O09.219 - SUPRVSN OF PREG W HISTORY OF PRE-TERM LABOR, UNSP 

TRIMESTER   Status: Acute   Current Visit: Yes   





(4) Elevated BP without diagnosis of hypertension


SNOMED Code(s): 135070102


   ICD Code: R03.0 - ELEVATED BLOOD-PRESSURE READING, W/O DIAGNOSIS OF HTN   

Status: Acute   Current Visit: Yes   





(5) GDM (gestational diabetes mellitus), class A1


SNOMED Code(s): 61532201


   ICD Code: O24.410 - GESTATIONAL DIABETES MELLITUS IN PREGNANCY, DIET 

CONTROLLED   Status: Chronic   Current Visit: No   


Problem List Initiated/Reviewed/Updated: Yes


Orders Last 24hrs: 


 Active Orders 24 hr











 Category Date Time Status


 


 Admission Status [Patient Status] [ADT] Routine ADT  19 22:27 Active


 


 Fetal BPP w NST [US] Routine Exams  19 07:38 Ordered


 


 OB Follow Up 1st Gest [US] Routine Exams  19 07:38 Ordered


 


 CULTURE GROUP B STREP [RM] Routine Lab  19 07:52 Received


 


 Sodium Chloride 0.9% [Normal Saline] 1,000 ml Med  19 23:45 Active





 IV ASDIRECTED   








 Medication Orders





Sodium Chloride (Normal Saline)  1,000 mls @ 100 mls/hr IV ASDIRECTED BE








Assessment/Plan Comment:: 





2019


35 yo  came in last night with contractions due to history of a  

birth and fast labor


Patient was noted to be marie and irritable along with elevated BPs but 

was stating she was having a lot of anxiety


Her father is currently on hospice and dying in her home town and she can't 

currently be with him which is causing a lot of stress


NST reactive


Minimal contractions lots of irritability


FHTs category one


Labs done for elevated BP-all negative


UA negative








Plan-


Continue to monitor for labor


Continue to monitor FHTs


IV fluids till discharge


Will do Ultrasound-BPP, OB follow up-growth, size, fluid level


Did GBS today at hospital


Did start vistaril for anxiety and sleep


Will decrease her work days per week and take her off work till Monday


Plan discharge later today

## 2019-08-14 NOTE — CRLUS
INDICATION:



Evaluate fetal anatomy.



COMPARISON:



none



TECHNIQUE:



Real time gray scale imaging of the fetus was performed as well as color 

Doppler and spectral Doppler analysis of the umbilical artery. Without 

non-stress testing.



FINDINGS:



Sonographic imaging demonstrates a single living intrauterine gestation.  

Fetus demonstrates a regular cardiac rate of 150 beats per minute.  Fetus 

has a cephalic orientation.  Amniotic fluid volume appears normal.  Single 

deepest vertical pocket: 3.5 cm with amniotic fluid index 10.88. The 

composite ultrasound gestational age is calculated at 35 weeks 1 day with 

an estimated sonographic due date of 09/17/2019. This is approximately 7 

days behind the due date by LMP which is 09/10/2019. The estimated fetal 

weight is 2555 grams.



The following biometric measurements were obtained:



Biparietal diameter:          8.63 cm/34 weeks 6 days 



Head circumference:         31.37 cm/35 weeks 2 days 



Abdominal circumference: 31.02 cm/35 weeks 0 days 



Femur length:                  6.79 cm/35 weeks 0 days 



The HC/AC ratio measures: 1.01 range (0.93-1.11) 



Limited fetal survey demonstrates a four-chamber heart is present. The 

stomach, kidneys, and bladder also appear normal.   



The fetus was active and demonstrated normal breathing movements. There was 

normal flexion and extension of the trunk and extremities. The fetus scored 

2 out of 2 in every category.



IMPRESSION:



1. Normal OB ultrasound exam with near concordance of clinical and 

sonographic dating.  No intrinsic abnormalities noted on limited anatomic 

survey. The composite ultrasound gestational age is 35 weeks 1 day and 

estimated sonographic due date is 09/17/2019. 



2. Normal biophysical profile score 8 out of 8.



Dictated by Maggie Decker MD @ Aug 14 2019 10:33AM



Signed by Dr. Maggie Decker @ Aug 14 2019 10:39AM

## 2019-08-31 PROCEDURE — HZ2ZZZZ DETOXIFICATION SERVICES FOR SUBSTANCE ABUSE TREATMENT: ICD-10-PCS | Performed by: INTERNAL MEDICINE

## 2019-09-03 ENCOUNTER — HOSPITAL ENCOUNTER (INPATIENT)
Dept: HOSPITAL 11 - JP.OB | Age: 34
LOS: 1 days | Discharge: HOME | DRG: 280 | End: 2019-09-04
Attending: ADVANCED PRACTICE MIDWIFE | Admitting: ADVANCED PRACTICE MIDWIFE
Payer: COMMERCIAL

## 2019-09-03 DIAGNOSIS — G43.909: ICD-10-CM

## 2019-09-03 DIAGNOSIS — Z79.01: ICD-10-CM

## 2019-09-03 DIAGNOSIS — E87.6: ICD-10-CM

## 2019-09-03 DIAGNOSIS — F17.210: ICD-10-CM

## 2019-09-03 DIAGNOSIS — R79.1: ICD-10-CM

## 2019-09-03 DIAGNOSIS — E66.9: ICD-10-CM

## 2019-09-03 DIAGNOSIS — F10.239: ICD-10-CM

## 2019-09-03 DIAGNOSIS — Z71.41: ICD-10-CM

## 2019-09-03 DIAGNOSIS — K70.30: ICD-10-CM

## 2019-09-03 DIAGNOSIS — E83.42: ICD-10-CM

## 2019-09-03 DIAGNOSIS — Z79.899: ICD-10-CM

## 2019-09-03 DIAGNOSIS — E86.0: ICD-10-CM

## 2019-09-03 DIAGNOSIS — J45.909: ICD-10-CM

## 2019-09-03 DIAGNOSIS — I82.502: ICD-10-CM

## 2019-09-03 DIAGNOSIS — F32.9: ICD-10-CM

## 2019-09-03 DIAGNOSIS — K70.10: Primary | ICD-10-CM

## 2019-09-03 DIAGNOSIS — F41.9: ICD-10-CM

## 2019-09-03 NOTE — PCM.DEL
L & D Note





- General Info


Date of Service: 19


Mother's Due Date: 09/10/19





- Delivery Note


Labor: Augmented by ARM


Cervical Ripening Method: Misoprostil


Delivery Outcome: Livebirth


Infant Delivery Method: Spontaneous Vaginal Delivery-Single


Infant Delivery Mode: Spontaneous


Birth Presentation: Right Occiput Anterior (ALOK)


Nuchal Cord: None


Anesthesia Type: Nitrous Oxide


Episiotomy Type: None


Laceration: None


Placenta: Intact, Spontaneous


Cord: 3 Vessels


Estimated Blood Loss: 100


Resuscitation Needed: No


: Stimulated


 Provider: Daphne Green


APGAR Score 1 min: 8


APGAR Score 5 min: 9


Second Stage Interventions: Reports: Second Nurse Assessed Progress of Descent, 

Second Nurse Reviewed Contraction Pattern, Second Nurse Reviewed Fetal Heart 

Tones, Encouragement Given, Pushing Effectively, Pushing, Pulls Own Legs Back


Delivery Comments (Free Text/Narrative):: 





9/3/19


33 yo G3 now P2 delivered vaginally without complications after a cytotec 

induction augmented by AROM of clear fluid. Baby girl born at 1648 

spontaneously in ALOK position. She was placed directly on mothers chest and 

stimulated. There was no nuchal cord. Apgars 8,9. Weight 6 lb 2 oz. Placenta 

delivered spontaneously intact with a 3 vessel cord.  mL. FF. Perineum 

intact, there were no vaginal or cervical lacerations. 


Stages of labor:


1- 2751-6595


2- 0850-4850


3- 4010-3375





Induction Criteria





- Justice Score


Justice Score Dilation: 3-4 cm


Justice Score Effacement: 60-70%


Justice Score Infant's Station: -1 ,0


Justice Score Consistency: Soft


Justice Score Cervix Position: Midposition


Justice Score Total: 9


Justice Score Presenting Part: Reports: Cephalic





- Induction


Gestational Age >/= 39 wks: Yes


Medical Indication: 





Gestational diabetes





Estimated Pelvis: Reports: Adequate


Reassuring Fetal Monitoring Strip: Yes


Absence of Tachy Systole: Yes





- Augmentation


Estimated Pelvis: Reports: Adequate


Fetal Weight Estimated:: Reports: AGA


Estimated Weight if LGA: 6 lb


Reassuring Fetal Monitoring Strip: Yes


Absence of Tachy Systole: Yes





- General Info


Date of Service: 19


Functional Status: Reports: Pain Controlled





- Review of Systems


General: Reports: No Symptoms


HEENT: Reports: No Symptoms


Pulmonary: Reports: No Symptoms


Cardiovascular: Reports: No Symptoms


Gastrointestinal: Reports: No Symptoms


Genitourinary: Reports: No Symptoms


Musculoskeletal: Reports: No Symptoms


Skin: Reports: No Symptoms


Neurological: Reports: No Symptoms


Psychiatric: Reports: No Symptoms





- Patient Data


Vitals - Most Recent: 


 Last Vital Signs











Temp  96.6 F   19 08:00


 


Pulse  89   19 12:00


 


Resp  20   19 12:00


 


BP  129/85   19 12:00


 


Pulse Ox  99   19 12:00











Weight - Most Recent: 168 lb 15.996 oz


Lab Results Last 24 Hours: 


 Laboratory Results - last 24 hr











  19 Range/Units





  07:07 07:07 07:10 


 


WBC    11.2 H  (4.5-11.0)  K/uL


 


RBC    4.58  (3.30-5.50)  M/uL


 


Hgb    14.2  (12.0-15.0)  g/dL


 


Hct    41.7  (36.0-48.0)  %


 


MCV    91  (80-98)  fL


 


MCH    31  (27-31)  pg


 


MCHC    34  (32-36)  %


 


Plt Count    166  (150-400)  K/uL


 


Neut % (Auto)    75 H  (36-66)  %


 


Lymph % (Auto)    19 L  (24-44)  %


 


Mono % (Auto)    5  (2-6)  %


 


Eos % (Auto)    1 L  (2-4)  %


 


Baso % (Auto)    0  (0-1)  %


 


Urine Color  Yellow    (YELLOW)  


 


Urine Appearance  Clear    (CLEAR)  


 


Urine pH  7.0    (5.0-8.0)  


 


Ur Specific Gravity  1.015    (1.008-1.030)  


 


Urine Protein  Negative    (NEGATIVE)  mg/dL


 


Urine Glucose (UA)  Normal    (NEGATIVE)  mg/dL


 


Urine Ketones  Negative    (NEGATIVE)  mg/dL


 


Urine Occult Blood  Negative    (NEGATIVE)  


 


Urine Nitrite  Negative    (NEGATIVE)  


 


Urine Bilirubin  Negative    (NEGATIVE)  


 


Urine Urobilinogen  0.2    (0.2-1.0)  EU/dL


 


Ur Leukocyte Esterase  Negative    (NEGATIVE)  


 


Urine RBC  0-5    (0-5)  


 


Urine WBC  0-5    (0-5)  


 


Ur Epithelial Cells  Few    


 


Amorphous Sediment  Not seen    


 


Urine Bacteria  Not seen    


 


Urine Mucus  Not seen    


 


Urine Opiates Screen   Negative   (NEGATIVE)  


 


Ur Oxycodone Screen   Negative   (NEGATIVE)  


 


Urine Methadone Screen   Negative   (NEGATIVE)  


 


Ur Propoxyphene Screen   Negative   (NEGATIVE)  


 


Ur Barbiturates Screen   Negative   (NEGATIVE)  


 


Ur Tricyclics Screen   Negative   (NEGATIVE)  


 


Ur Phencyclidine Scrn   Negative   (NEGATIVE)  


 


Ur Amphetamine Screen   Negative   (NEGATIVE)  


 


U Methamphetamines Scrn   Negative   (NEGATIVE)  


 


Urine MDMA Screen   Negative   (NEGATIVE)  


 


U Benzodiazepines Scrn   Negative   (NEGATIVE)  


 


U Cocaine Metab Screen   Negative   (NEGATIVE)  


 


U Marijuana (THC) Screen   Negative   (NEGATIVE)  











Med Orders - Current: 


 Current Medications





Calcium Carbonate/Glycine (Tums)  1,000 mg PO Q2H PRN


   PRN Reason: Indigestion


Fentanyl (Sublimaze)  100 mcg IVPUSH Q1H PRN


   PRN Reason: Pain (moderate 4-6)


Oxytocin/Sodium Chloride (Pitocin In Ns 20 Units/1,000 Ml)  20 unit in 1,000 

mls @ 999 mls/hr IV ASDIRECTED BE; Protocol


Ondansetron HCl (Zofran)  4 mg IV Q4H PRN


   PRN Reason: Nausea/Vomiting


   Last Admin: 19 15:46 Dose:  4 mg


Sodium Chloride (Saline Flush)  10 ml FLUSH ASDIRECTED PRN


   PRN Reason: Keep Vein Open





Discontinued Medications





Lidocaine HCl (Xylocaine 1%) Confirm Administered Dose 50 ml .ROUTE .STK-MED ONE


   Stop: 19 14:23


Misoprostol (Cytotec) Confirm Administered Dose 50 mcg .ROUTE .STK-MED ONE


   Stop: 19 07:59


   Last Admin: 19 08:07 Dose:  50 mcg











- Exam


General: Alert, Oriented


HEENT: Pupils Equal, Pupils Reactive, EOMI, Mucous Membr. Moist/Pink


Neck: Supple


Lungs: Clear to Auscultation, Normal Respiratory Effort


Cardiovascular: Regular Rate, Regular Rhythm


GI/Abdominal Exam: Normal Bowel Sounds, Soft, Non-Tender, No Organomegaly, 

Pelvis Stable


 (Female) Exam: Normal External Exam, Normal Bimanual Exam, Cervical 

Dilatation, Vaginal Bleeding.  No: Cervical Lesions, Vaginal Tears


Back Exam: Normal Inspection, Full Range of Motion


Extremities: Normal Inspection, Normal Range of Motion, Non-Tender, No Pedal 

Edema, Normal Capillary Refill


Skin: Warm, Dry, Intact


Wound/Incisions: Healing Well


Neurological: No New Focal Deficit


Psy/Mental Status: Alert, Normal Affect, Normal Mood





- Problem List & Annotations


(1) Elective induction of labor planned


SNOMED Code(s): 143121632


   Code(s): YIT6367 -    Status: Acute   Current Visit: Yes   





(2) History of  delivery, currently pregnant


SNOMED Code(s): 083559333, 256566570


   Code(s): O09.219 - SUPRVSN OF PREG W HISTORY OF PRE-TERM LABOR, UNSP 

TRIMESTER   Status: Acute   Current Visit: No   





(3) Elevated BP without diagnosis of hypertension


SNOMED Code(s): 062323884


   Code(s): R03.0 - ELEVATED BLOOD-PRESSURE READING, W/O DIAGNOSIS OF HTN   

Status: Acute   Current Visit: No   





(4) GDM (gestational diabetes mellitus), class A1


SNOMED Code(s): 32282935


   Code(s): O24.410 - GESTATIONAL DIABETES MELLITUS IN PREGNANCY, DIET 

CONTROLLED   Status: Chronic   Current Visit: No   





(5) Vaginal delivery


SNOMED Code(s): 720744509


   Code(s): O80 - ENCOUNTER FOR FULL-TERM UNCOMPLICATED DELIVERY   Status: 

Acute   Current Visit: Yes   





(6) Breastfeeding started


SNOMED Code(s): 297184298


   Code(s): LWU3192 -    Status: Acute   Current Visit: Yes   





- Problem List Review


Problem List Initiated/Reviewed/Updated: Yes





- My Orders


Last 24 Hours: 


My Active Orders





19 08:07


Patient Status [ADT] Routine 


Communication Order [RC] ASDIRECTED 


Notify Provider [RC] PRN 


Up ad [place] [RC] ASDIRECTED 


Vital Signs [RC] PER UNIT ROUTINE 


Calcium Carbonate [Tums]   1,000 mg PO Q2H PRN 


Ondansetron [Zofran]   4 mg IV Q4H PRN 


Sodium Chloride 0.9% [Saline Flush]   10 ml FLUSH ASDIRECTED PRN 


fentaNYL [Sublimaze]   100 mcg IVPUSH Q1H PRN 


Saline Lock Insert [OM.PC] Routine 


Resuscitation Status Routine 





19 08:09


Notify Provider Vital Signs [RC] PRN 





19 08:11


Oxytocin/Normal Saline [Pitocin in NS 20 Units/1,000 ML] 20 unit in 1,000 ml IV 

ASDIRECTED 





19 16:14


Communication Order [RC] Per Unit Routine 


Nitrous Oxide Delivery [RC] ASDIRECTED 


Oxygen Therapy [RC] ASDIRECTED 


Pulse Oximetry [RC] ASDIRECTED 


Verify Patient Consent Obtain [RC] ASDIRECTED 


Vital Signs [RC] PER UNIT ROUTINE 





19 17:05


Consult to Lactation Consultant [CONS] Routine 


Acetaminophen [Tylenol Bulk Bottle]   See Dose Instructions  PO Q4H PRN 


Benzocaine [Nwfi-S-Yoqqxqx 20% Spray]   1 gm TOP Q4H PRN 


Benzocaine [Fmxe-Y-Pvxbirr 20% Spray]   See Dose Instructions  TOP ONETIME ONE 


Ibuprofen [Motrin Bulk Bottle]   600 mg PO Q6H PRN 


Lanolin [Lansinoh HPA]   1 gm TOP ONETIME ONE 


Lanolin [Lansinoh HPA]   40 gm TOP ASDIRECTED PRN 


Witch Hazel [Tucks]   1 pad TOP ASDIRECTED PRN 


Witch Hazel [Tucks]   1 pad TOP ONETIME ONE 


Assess Lochia [WOMSER] Per Unit Routine 


Assess Uterine Involution [WOMSER] Per Unit Routine 


DVT/VTE Prophylaxis Reflex [OM.PC] Routine 





19 17:06


Patient Status [ADT] Routine 


Vital Signs [RC] PFP 


Sitz Bath [OM.PC] Per Unit Routine 





19 17:07


VTE/DVT Education [RC] Click to Edit 


Ice Therapy [OM.PC] Per Unit Routine 


Perineal Care [OM.PC] Per Unit Routine 


Peripheral IV Discontinue [OM.PC] Routine 





19 Breakfast


Regular Diet [DIET] 





19 06:00


CBC WITH AUTO DIFF [HEME] Routine 














- Assessment


Assessment:: 





9/3/19


33 yo  with  of female infant


Perineum intact


 mL, FF


Breastfeeding


Gestational diabetic with well controlled sugars on oral medication





- Plan


Plan:: 





9/3/19


Assessment:


33 yo  here for planned induction at 39 weeks


GDM controlled with oral medication


Hx of prior vaginal delivery


SVE 3/70/-1





Plan:


50 mcg Cytotec was inserted vaginally for induction of labor


Anticipate 


Reassess around noon for possible AROM or another Cytotec insertion or sooner 

if needed


Plans to use Nitrous for pain control


---------------------------------------------------


9/3/19


Routine postpartum cares


[facility] kit for pain


Lactation support


Anticipate 24-48 hour stay

## 2019-09-03 NOTE — PCM.PNLD
<Jillian Roger - Last Filed: 19 16:10>





Labor Progress Note





- VS & Meds


Vital Signs: 


 Last Vital Signs











Temp  35.9 C   19 08:00


 


Pulse  89   19 12:00


 


Resp  20   19 12:00


 


BP  129/85   19 12:00


 


Pulse Ox  99   19 12:00











Active Medications: 


 Current Medications





Calcium Carbonate/Glycine (Tums)  1,000 mg PO Q2H PRN


   PRN Reason: Indigestion


Fentanyl (Sublimaze)  100 mcg IVPUSH Q1H PRN


   PRN Reason: Pain (moderate 4-6)


Oxytocin/Sodium Chloride (Pitocin In Ns 20 Units/1,000 Ml)  20 unit in 1,000 

mls @ 999 mls/hr IV ASDIRECTED BE; Protocol


Ondansetron HCl (Zofran)  4 mg IV Q4H PRN


   PRN Reason: Nausea/Vomiting


   Last Admin: 19 15:46 Dose:  4 mg


Sodium Chloride (Saline Flush)  10 ml FLUSH ASDIRECTED PRN


   PRN Reason: Keep Vein Open





Discontinued Medications





Lidocaine HCl (Xylocaine 1%) Confirm Administered Dose 50 ml .ROUTE .STK-MED ONE


   Stop: 19 14:23


Misoprostol (Cytotec) Confirm Administered Dose 50 mcg .ROUTE .STK-MED ONE


   Stop: 19 07:59


   Last Admin: 19 08:07 Dose:  50 mcg











- Uterine Contractions


Uterine Monitoring Mode: External [place]


Contraction Frequency (min): 2


Contraction Duration (sec): 20-50


Contraction Intensity: Moderate


Uterine Resting Tone: Soft





- Fetal Monitoring


Fetal Monitor Mode: External Ultrasound


Fetal Heart Rate (FHR) Variability: Moderate (6-25 bmp)


Fetal Accelerations: Present, 15x15


Fetal Decelerations: Early


Fetal Strip Review: Category I





- Vaginal Exam


Dilation (cm): 7


Effacement (Percent): 90


Station: -1


Cervical Position: Midposition


Sterile Vaginal Exam Performed By: Jillian Roger


Vaginal Exam Comment: cytotec 50mg placed by provider at 0802





- Labor Progress (Free Text)


Labor Progress: 





9/3/19


Pt progressed nicely in the tub this afternoon. SVE 7/90/-1. She is using 

nitrous now and coping well with labor. Anticipate . Early decelerations 

noted with contractions. 





<Daphne Green SIENNA - Last Filed: 19 08:36>





Labor Progress Note





- VS & Meds


Vital Signs: 


 Last Vital Signs











Temp  96.2 F   19 13:14


 


Pulse  69   19 13:14


 


Resp  18   19 13:14


 


BP  110/65   19 13:14


 


Pulse Ox  99   19 13:14











Active Medications: 


 Current Medications








Discontinued Medications





Acetaminophen (Tylenol Bulk Bottle)  325 - 650 mg PO Q4H PRN


   PRN Reason: Pain


Benzocaine (Nfpl-X-Rbigcrq 20% Spray)  0 gm TOP ONETIME ONE


   Stop: 19 17:06


   Last Admin: 19 20:17 Dose:  1 applic


Benzocaine (Xvnh-J-Eklibec 20% Spray)  0 gm TOP Q4H PRN


   PRN Reason: Other


Calcium Carbonate/Glycine (Tums)  1,000 mg PO Q2H PRN


   PRN Reason: Indigestion


Emollient Ointment (Lansinoh Hpa)  0 gm TOP ONETIME ONE


   Stop: 19 17:06


   Last Admin: 19 20:17 Dose:  1 applic


Emollient Ointment (Lansinoh Hpa)  0 gm TOP ASDIRECTED PRN


   PRN Reason: Other


Fentanyl (Sublimaze)  100 mcg IVPUSH Q1H PRN


   PRN Reason: Pain (moderate 4-6)


Oxytocin/Sodium Chloride (Pitocin In Ns 20 Units/1,000 Ml)  20 unit in 1,000 

mls @ 999 mls/hr IV ASDIRECTED BE; Protocol


   Last Admin: 19 17:51 Dose:  999 mls/hr, 999 mls/hr


Ibuprofen (Motrin Bulk Bottle)  600 mg PO Q6H PRN


   PRN Reason: Pain


   Last Admin: 19 20:19 Dose:  3 tab


Lidocaine HCl (Xylocaine 1%) Confirm Administered Dose 50 ml .ROUTE .STK-MED ONE


   Stop: 19 14:23


   Last Admin: 19 18:58 Dose:  Not Given


Misoprostol (Cytotec) Confirm Administered Dose 50 mcg .ROUTE .STK-MED ONE


   Stop: 19 07:59


   Last Admin: 19 08:07 Dose:  50 mcg


Ondansetron HCl (Zofran)  4 mg IV Q4H PRN


   PRN Reason: Nausea/Vomiting


   Last Admin: 19 15:46 Dose:  4 mg


Sodium Chloride (Saline Flush)  10 ml FLUSH ASDIRECTED PRN


   PRN Reason: Keep Vein Open


Witch Hazel (Tucks)  1 pad TOP ONETIME ONE


   Stop: 19 17:06


   Last Admin: 19 20:18 Dose:  1 pad


Witch Hazel (Tucks)  1 pad TOP ASDIRECTED PRN


   PRN Reason: Other











- Labor Progress (Free Text)


Labor Progress: 





I personally performed or re-performed the physical examination and medical 

decision making. I have verified all student documentation or findings, 

including history, physical exam and/or medical decision making.


Daphne MANZANO, ILIR, CNM

## 2019-09-03 NOTE — PCM.PNLD
<Jillian Roger - Last Filed: 09/03/19 12:23>





Labor Progress Note





- VS & Meds


Vital Signs: 


 Last Vital Signs











Temp  35.9 C   09/03/19 08:00


 


Pulse  89   09/03/19 12:00


 


Resp  20   09/03/19 12:00


 


BP  129/85   09/03/19 12:00


 


Pulse Ox  99   09/03/19 12:00











Active Medications: 


 Current Medications





Calcium Carbonate/Glycine (Tums)  1,000 mg PO Q2H PRN


   PRN Reason: Indigestion


Fentanyl (Sublimaze)  100 mcg IVPUSH Q1H PRN


   PRN Reason: Pain (moderate 4-6)


Oxytocin/Sodium Chloride (Pitocin In Ns 20 Units/1,000 Ml)  20 unit in 1,000 

mls @ 999 mls/hr IV ASDIRECTED BE; Protocol


Ondansetron HCl (Zofran)  4 mg IV Q4H PRN


   PRN Reason: Nausea/Vomiting


Sodium Chloride (Saline Flush)  10 ml FLUSH ASDIRECTED PRN


   PRN Reason: Keep Vein Open





Discontinued Medications





Misoprostol (Cytotec) Confirm Administered Dose 50 mcg .ROUTE .STK-MED ONE


   Stop: 09/03/19 07:59


   Last Admin: 09/03/19 08:07 Dose:  50 mcg











- Uterine Contractions


Uterine Monitoring Mode: External [place]


Contraction Frequency (min): 2-5


Contraction Duration (sec): 40-50


Contraction Intensity: Mild


Uterine Resting Tone: Soft





- Fetal Monitoring


Fetal Monitor Mode: External Ultrasound


Fetal Heart Rate (FHR) Variability: Moderate (6-25 bmp)


Fetal Accelerations: Present, 15x15


Fetal Decelerations: None


Fetal Strip Review: Category I





- Vaginal Exam


Dilation (cm): 3


Effacement (Percent): 70


Station: -1


Sterile Vaginal Exam Performed By: Jillian Roger


Vaginal Exam Comment: cytotec 50mg placed by provider at 0802





- Labor Progress (Free Text)


Labor Progress: 





9/3/19


Patient progressed to 3-4/80/-1. AROM completed with scant amount of clear 

fluid. Patient feels contractions but does not rate them as painful yet. 

Category 1 tracing. 





<Daphne Green - Last Filed: 09/06/19 08:35>





Labor Progress Note





- VS & Meds


Vital Signs: 


 Last Vital Signs











Temp  96.2 F   09/04/19 13:14


 


Pulse  69   09/04/19 13:14


 


Resp  18   09/04/19 13:14


 


BP  110/65   09/04/19 13:14


 


Pulse Ox  99   09/04/19 13:14











Active Medications: 


 Current Medications








Discontinued Medications





Acetaminophen (Tylenol Bulk Bottle)  325 - 650 mg PO Q4H PRN


   PRN Reason: Pain


Benzocaine (Xskg-S-Riabrvr 20% Spray)  0 gm TOP ONETIME ONE


   Stop: 09/03/19 17:06


   Last Admin: 09/03/19 20:17 Dose:  1 applic


Benzocaine (Frfo-Q-Qaoddpd 20% Spray)  0 gm TOP Q4H PRN


   PRN Reason: Other


Calcium Carbonate/Glycine (Tums)  1,000 mg PO Q2H PRN


   PRN Reason: Indigestion


Emollient Ointment (Lansinoh Hpa)  0 gm TOP ONETIME ONE


   Stop: 09/03/19 17:06


   Last Admin: 09/03/19 20:17 Dose:  1 applic


Emollient Ointment (Lansinoh Hpa)  0 gm TOP ASDIRECTED PRN


   PRN Reason: Other


Fentanyl (Sublimaze)  100 mcg IVPUSH Q1H PRN


   PRN Reason: Pain (moderate 4-6)


Oxytocin/Sodium Chloride (Pitocin In Ns 20 Units/1,000 Ml)  20 unit in 1,000 

mls @ 999 mls/hr IV ASDIRECTED BE; Protocol


   Last Admin: 09/03/19 17:51 Dose:  999 mls/hr, 999 mls/hr


Ibuprofen (Motrin Bulk Bottle)  600 mg PO Q6H PRN


   PRN Reason: Pain


   Last Admin: 09/03/19 20:19 Dose:  3 tab


Lidocaine HCl (Xylocaine 1%) Confirm Administered Dose 50 ml .ROUTE .STK-MED ONE


   Stop: 09/03/19 14:23


   Last Admin: 09/03/19 18:58 Dose:  Not Given


Misoprostol (Cytotec) Confirm Administered Dose 50 mcg .ROUTE .STK-MED ONE


   Stop: 09/03/19 07:59


   Last Admin: 09/03/19 08:07 Dose:  50 mcg


Ondansetron HCl (Zofran)  4 mg IV Q4H PRN


   PRN Reason: Nausea/Vomiting


   Last Admin: 09/03/19 15:46 Dose:  4 mg


Sodium Chloride (Saline Flush)  10 ml FLUSH ASDIRECTED PRN


   PRN Reason: Keep Vein Open


Witch Hazel (Tucks)  1 pad TOP ONETIME ONE


   Stop: 09/03/19 17:06


   Last Admin: 09/03/19 20:18 Dose:  1 pad


Witch Hazel (Tucks)  1 pad TOP ASDIRECTED PRN


   PRN Reason: Other











- Labor Progress (Free Text)


Labor Progress: 





I personally performed or re-performed the physical examination and medical 

decision making. I have verified all student documentation or findings, 

including history, physical exam and/or medical decision making.


ILIR Zelaya

## 2019-09-03 NOTE — PCM.LDHP
L&D History of Present Illness





- General


Date of Service: 19


Admit Problem/Dx: 


 Patient Status Order with Admit Dx/Problem





19 08:07


Patient Status [ADT] Routine 








 Admission Diagnosis/Problem











Admission Diagnosis/Problem    Gestational diabetes mellitus














Source of Information: Patient


History Limitations: Reports: No Limitations





- History of Present Illness


Introduction:: 





9/3/19


33 yo  is here at 39 0/7 weeks for induction of labor for gestational 

diabetes that has been controlled with oral medications. Her fasting blood 

sugars this weekend were <100 and her post meals were 120-130. Her blood 

pressure has been slightly elevated starting last week, lab work was done and 

was negative for preeclampsia and she denies any blurred vision or headaches. 

She has no edema. Her last baby was born early so this pregnancy she has 

received progesterone injections. Blood type A pos, GBS neg, STI's all 

negative. Anticipate . 


Worsens with: Reports: None


Associated Symptoms: Denies: vaginal bleeding, vaginal discharge





- Related Data


Allergies/Adverse Reactions: 


 Allergies











Allergy/AdvReac Type Severity Reaction Status Date / Time


 


No Known Allergies Allergy   Verified 17 20:24











Home Medications: 


 Home Meds





Prenat Vit Comb.10/Iron/Fa/Dha [Vitafol-OB + DHA] 1 each PO DAILY 19 [

History]


glyBURIDE [Micronase] 2.5 mg PO DAILY 19 [History]











Past Medical History


OB/GYN History: Reports: Polycystic Ovaries, Pregnancy, Spontaneous , 

Other (See Below) (hx  delivery)


: 3


Para: 1


LMP (Approximate): Pregnant


Other OB/BYN History: delivery 36 weeks


Musculoskeletal History: Reports: Fracture


Other Musculoskeletal History: Bilateral arms


Endocrine/Metabolic History: Reports: Diabetes, Gestational





- Infectious Disease History


Infectious Disease History: Reports: Chicken Pox





- Past Surgical History


HEENT Surgical History: Reports: Adenoidectomy, Tonsillectomy


Other Respiratory Surgeries/Procedures: smoker; 3-4 cigs/day





Social & Family History





- Family History


Family Medical History: Noncontributory





- Caffeine Use


Caffeine Use: Reports: Coffee, Soda, Tea


Other Caffeine Use: very little





H&P Review of Systems





- Review of Systems:


Review Of Systems: See Below


General: Reports: No Symptoms


HEENT: Reports: No Symptoms


Pulmonary: Reports: No Symptoms


Cardiovascular: Reports: No Symptoms


Gastrointestinal: Reports: No Symptoms


Genitourinary: Reports: No Symptoms


Musculoskeletal: Reports: No Symptoms


Skin: Reports: No Symptoms


Psychiatric: Reports: No Symptoms


Neurological: Reports: No Symptoms


Hematologic/Lymphatic: Reports: No Symptoms


Immunologic: Reports: No Symptoms





L&D Exam





- Exam


Exam: See Below





- Vital Signs


Vital Signs: 


 Last Vital Signs











Temp  96.6 F   19 07:56


 


Pulse  116 H  19 07:56


 


Resp  20   19 07:56


 


BP  130/99 H  19 07:56


 


Pulse Ox      














- OB Specific


Contraction Intensity: Mild


Fetal Movement: Active


Fetal Heart Tones: Present


Fetal Heart Tones per Min: 130


Fetal Heart Rate (FHR) Variability: Moderate (6-25 bmp)


Birth Presentation: Vertex


Estimated Fetal Weight: 6-7#





- Justice Score


Justice Score Cervix Position: Midposition


Justice Score Consistency: Soft


Justice Score Effacement: 51-70%


Justice Score Dilation: 3-4 cm


Justice Score Infant's Station: -1 ,0


Justice Score Total: 9





- Exam


General: Alert, Oriented


HEENT: PERRLA, Conjunctiva Clear, EOMI, Hearing Intact, Mucosa Moist & Pink, 

Nares Patent, Normal Nasal Septum


Neck: Supple, Trachea Midline


Lungs: Clear to Auscultation, Normal Respiratory Effort


Cardiovascular: Regular Rate, Regular Rhythm


GI/Abdominal Exam: Normal Bowel Sounds, Soft, Non-Tender, No Distention, Pelvis 

Stable


Rectal Exam: Normal Exam, Normal Rectal Tone


Genitourinary: Normal external exam, Normal bimanual exam, Cervical dilitation, 

Enlarged uterus


Back Exam: Normal Inspection, Full Range of Motion


Extremities: Normal Inspection, Normal Range of Motion, Non-Tender, No Pedal 

Edema, Normal Capillary Refill


Skin: Warm, Dry, Intact


Neurological: Cranial Nerves Intact, Reflexes Equal Bilateral


Psychiatric: Alert, Normal Affect, Normal Mood





- Patient Data


Lab Results Last 24 hrs: 


 Laboratory Results - last 24 hr











  19 Range/Units





  07:07 07:07 07:10 


 


WBC    11.2 H  (4.5-11.0)  K/uL


 


RBC    4.58  (3.30-5.50)  M/uL


 


Hgb    14.2  (12.0-15.0)  g/dL


 


Hct    41.7  (36.0-48.0)  %


 


MCV    91  (80-98)  fL


 


MCH    31  (27-31)  pg


 


MCHC    34  (32-36)  %


 


Plt Count    166  (150-400)  K/uL


 


Neut % (Auto)    75 H  (36-66)  %


 


Lymph % (Auto)    19 L  (24-44)  %


 


Mono % (Auto)    5  (2-6)  %


 


Eos % (Auto)    1 L  (2-4)  %


 


Baso % (Auto)    0  (0-1)  %


 


Urine Color  Yellow    (YELLOW)  


 


Urine Appearance  Clear    (CLEAR)  


 


Urine pH  7.0    (5.0-8.0)  


 


Ur Specific Gravity  1.015    (1.008-1.030)  


 


Urine Protein  Negative    (NEGATIVE)  mg/dL


 


Urine Glucose (UA)  Normal    (NEGATIVE)  mg/dL


 


Urine Ketones  Negative    (NEGATIVE)  mg/dL


 


Urine Occult Blood  Negative    (NEGATIVE)  


 


Urine Nitrite  Negative    (NEGATIVE)  


 


Urine Bilirubin  Negative    (NEGATIVE)  


 


Urine Urobilinogen  0.2    (0.2-1.0)  EU/dL


 


Ur Leukocyte Esterase  Negative    (NEGATIVE)  


 


Urine RBC  0-5    (0-5)  


 


Urine WBC  0-5    (0-5)  


 


Ur Epithelial Cells  Few    


 


Amorphous Sediment  Not seen    


 


Urine Bacteria  Not seen    


 


Urine Mucus  Not seen    


 


Urine Opiates Screen   Negative   (NEGATIVE)  


 


Ur Oxycodone Screen   Negative   (NEGATIVE)  


 


Urine Methadone Screen   Negative   (NEGATIVE)  


 


Ur Propoxyphene Screen   Negative   (NEGATIVE)  


 


Ur Barbiturates Screen   Negative   (NEGATIVE)  


 


Ur Tricyclics Screen   Negative   (NEGATIVE)  


 


Ur Phencyclidine Scrn   Negative   (NEGATIVE)  


 


Ur Amphetamine Screen   Negative   (NEGATIVE)  


 


U Methamphetamines Scrn   Negative   (NEGATIVE)  


 


Urine MDMA Screen   Negative   (NEGATIVE)  


 


U Benzodiazepines Scrn   Negative   (NEGATIVE)  


 


U Cocaine Metab Screen   Negative   (NEGATIVE)  


 


U Marijuana (THC) Screen   Negative   (NEGATIVE)  











Result Diagrams: 


 19 07:10








- Problem List


(1) Elective induction of labor planned


SNOMED Code(s): 666013679


   ICD Code: [id number] -    Status: Acute   Current Visit: Yes   





(2) History of  delivery, currently pregnant


SNOMED Code(s): 759245624, 887689539


   ICD Code: O09.219 - SUPRVSN OF PREG W HISTORY OF PRE-TERM LABOR, UNSP 

TRIMESTER   Status: Acute   Current Visit: No   





(3) Elevated BP without diagnosis of hypertension


SNOMED Code(s): 359904880


   ICD Code: R03.0 - ELEVATED BLOOD-PRESSURE READING, W/O DIAGNOSIS OF HTN   

Status: Acute   Current Visit: No   





(4) GDM (gestational diabetes mellitus), class A1


SNOMED Code(s): 15165974


   ICD Code: O24.410 - GESTATIONAL DIABETES MELLITUS IN PREGNANCY, DIET 

CONTROLLED   Status: Chronic   Current Visit: No   


Problem List Initiated/Reviewed/Updated: Yes


Orders Last 24hrs: 


 Active Orders 24 hr











 Category Date Time Status


 


 Patient Status [ADT] Routine ADT  19 08:07 Ordered


 


 Communication Order [RC] ASDIRECTED Care  19 08:07 Ordered


 


 Fetal Heart Tones [RC] PER UNIT ROUTINE Care  19 08:07 Ordered


 


 Notify Provider Vital Signs [RC] PRN Care  19 08:09 Ordered


 


 Notify Provider [RC] PRN Care  19 08:07 Ordered


 


 Up ad [place] [RC] ASDIRECTED Care  19 08:07 Ordered


 


 Vital Signs [RC] PER UNIT ROUTINE Care  19 08:07 Ordered


 


 Regular Diet [DIET] Diet  19 Breakfast Ordered


 


 Calcium Carbonate [Tums] Med  19 08:07 Ordered





 1,000 mg PO Q2HR PRN   


 


 Ondansetron [Zofran] Med  19 08:07 Ordered





 4 mg IV Q4H PRN   


 


 Oxytocin/Normal Saline [Pitocin in NS 20 Units/1,000 ML Med  19 08:11 

Ordered





 ]   





 20 unit in 1,000 ml IV ONETIME   


 


 Sodium Chloride 0.9% [Saline Flush] Med  19 08:07 Ordered





 10 ml FLUSH ASDIRECTED PRN   


 


 fentaNYL [Sublimaze] Med  19 08:07 Ordered





 100 mcg IVPUSH Q1H PRN   


 


 Saline Lock Insert [OM.PC] Routine Oth  19 08:07 Ordered


 


 Resuscitation Status Routine Resus Stat  19 08:07 Ordered








 Medication Orders





Calcium Carbonate/Glycine (Tums)  1,000 mg PO Q2HR PRN


   PRN Reason: Indigestion


Fentanyl (Sublimaze)  100 mcg IVPUSH Q1H PRN


   PRN Reason: Pain (moderate 4-6)


Ondansetron HCl (Zofran)  4 mg IV Q4H PRN


   PRN Reason: Nausea/Vomiting


Sodium Chloride (Saline Flush)  10 ml FLUSH ASDIRECTED PRN


   PRN Reason: Keep Vein Open








Assessment/Plan Comment:: 





9/3/19


Assessment:


33 yo  here for planned induction at 39 weeks


GDM controlled with oral medication


Hx of prior vaginal delivery


SVE 3/70/-1





Plan:


50 mcg Cytotec was inserted vaginally for induction of labor


Anticipate 


Reassess around noon for possible AROM or another Cytotec insertion or sooner 

if needed


Plans to use Nitrous for pain control

## 2019-09-04 VITALS — SYSTOLIC BLOOD PRESSURE: 110 MMHG | DIASTOLIC BLOOD PRESSURE: 65 MMHG

## 2019-09-04 NOTE — PCM.PNPP
- General Info


Date of Service: 19


Functional Status: Reports: Pain Controlled





- Review of Systems


General: Reports: No Symptoms


HEENT: Reports: No Symptoms


Pulmonary: Reports: No Symptoms


Cardiovascular: Reports: No Symptoms


Gastrointestinal: Reports: No Symptoms


Genitourinary: Reports: No Symptoms


Musculoskeletal: Reports: No Symptoms


Skin: Reports: No Symptoms


Neurological: Reports: No Symptoms


Psychiatric: Reports: No Symptoms





- General Info


Date of Service: 19





- Patient Data


Vital Signs - Most Recent: 


 Last Vital Signs











Temp  35.9 C   19 04:00


 


Pulse  76   19 04:00


 


Resp  16   19 04:00


 


BP  118/60   19 04:00


 


Pulse Ox  97   19 04:00











Weight - Most Recent: 76.657 kg


I&O - Last 24 Hours: 


 Intake & Output











 19





 22:59 06:59 14:59


 


Intake Total 450  


 


Balance 450  











Lab Results - Last 24 Hours: 


 Laboratory Results - last 24 hr











  19 Range/Units





  05:40 


 


WBC  17.7 H  (4.5-11.0)  K/uL


 


RBC  4.08  (3.30-5.50)  M/uL


 


Hgb  12.7  (12.0-15.0)  g/dL


 


Hct  37.3  (36.0-48.0)  %


 


MCV  91  (80-98)  fL


 


MCH  31  (27-31)  pg


 


MCHC  34  (32-36)  %


 


Plt Count  147 L  (150-400)  K/uL


 


Neut % (Auto)  73 H  (36-66)  %


 


Lymph % (Auto)  18 L  (24-44)  %


 


Mono % (Auto)  9 H  (2-6)  %


 


Eos % (Auto)  0 L  (2-4)  %


 


Baso % (Auto)  0  (0-1)  %











Med Orders - Current: 


 Current Medications





Acetaminophen (Tylenol Bulk Bottle)  325 - 650 mg PO Q4H PRN


   PRN Reason: Pain


Benzocaine (Amfg-M-Mwyigdc 20% Spray)  0 gm TOP Q4H PRN


   PRN Reason: Other


Calcium Carbonate/Glycine (Tums)  1,000 mg PO Q2H PRN


   PRN Reason: Indigestion


Emollient Ointment (Lansinoh Hpa)  0 gm TOP ASDIRECTED PRN


   PRN Reason: Other


Fentanyl (Sublimaze)  100 mcg IVPUSH Q1H PRN


   PRN Reason: Pain (moderate 4-6)


Oxytocin/Sodium Chloride (Pitocin In Ns 20 Units/1,000 Ml)  20 unit in 1,000 

mls @ 999 mls/hr IV ASDIRECTED BE; Protocol


   Last Admin: 19 17:51 Dose:  999 mls/hr, 999 mls/hr


Ibuprofen (Motrin Bulk Bottle)  600 mg PO Q6H PRN


   PRN Reason: Pain


   Last Admin: 19 20:19 Dose:  3 tab


Ondansetron HCl (Zofran)  4 mg IV Q4H PRN


   PRN Reason: Nausea/Vomiting


   Last Admin: 19 15:46 Dose:  4 mg


Sodium Chloride (Saline Flush)  10 ml FLUSH ASDIRECTED PRN


   PRN Reason: Keep Vein Open


Witch Hazel (Tucks)  1 pad TOP ASDIRECTED PRN


   PRN Reason: Other





Discontinued Medications





Benzocaine (Kvis-D-Kttatdn 20% Spray)  0 gm TOP ONETIME ONE


   Stop: 19 17:06


   Last Admin: 19 20:17 Dose:  1 applic


Emollient Ointment (Lansinoh Hpa)  0 gm TOP ONETIME ONE


   Stop: 19 17:06


   Last Admin: 19 20:17 Dose:  1 applic


Lidocaine HCl (Xylocaine 1%) Confirm Administered Dose 50 ml .ROUTE .STK-MED ONE


   Stop: 19 14:23


   Last Admin: 19 18:58 Dose:  Not Given


Misoprostol (Cytotec) Confirm Administered Dose 50 mcg .ROUTE .STK-MED ONE


   Stop: 19 07:59


   Last Admin: 19 08:07 Dose:  50 mcg


Witch Hazel (Tucks)  1 pad TOP ONETIME ONE


   Stop: 19 17:06


   Last Admin: 19 20:18 Dose:  1 pad











- Infant Interaction


Infant Disposition, Postpartum:  in Room with Family


Infant Interaction: Holding Infant


Infant Feeding:  Infant; Nursed Well


Support Person: 





- Postpartum Recovery Exam


Fundal Tone: Firm


Fundal Level: At Umbilicus


Fundal Placement: Midline


Lochia Amount: Moderate


Lochia Color: Rubra/Red


Perineum Description: Intact, Minimal Bruising/Swelling


Bladder Status: Voiding





- Exam


General: Alert, Oriented


HEENT: Pupils Equal


Neck: Supple


Lungs: Clear to Auscultation, Normal Respiratory Effort


Cardiovascular: Regular Rate, Regular Rhythm


GI/Abdominal Exam: Normal Bowel Sounds, Soft, Non-Tender, No Distention, Pelvis 

Stable


Extremities: Normal Inspection, Normal Range of Motion, Non-Tender, No Pedal 

Edema, Normal Capillary Refill


Skin: Warm, Dry, Intact


Neurological: No New Focal Deficit


Psy/Mental Status: Alert, Normal Affect, Normal Mood





- Problem List & Annotations


(1) Breastfeeding started


SNOMED Code(s): 073817287


   Code(s): SVX8341 -    Status: Acute   Current Visit: Yes   





(2) Elective induction of labor planned


SNOMED Code(s): 921316670


   Code(s): XPV2413 -    Status: Acute   Current Visit: Yes   





(3) Vaginal delivery


SNOMED Code(s): 033952890


   Code(s): O80 - ENCOUNTER FOR FULL-TERM UNCOMPLICATED DELIVERY   Status: 

Acute   Current Visit: Yes   





(4) GDM (gestational diabetes mellitus), class A1


SNOMED Code(s): 76319319


   Code(s): O24.410 - GESTATIONAL DIABETES MELLITUS IN PREGNANCY, DIET 

CONTROLLED   Status: Chronic   Current Visit: No   





- Problem List Review


Problem List Initiated/Reviewed/Updated: Yes





- Assessment


Assessment:: 





9/3/19


35 yo  with  of female infant


Perineum intact


 mL, FF


Breastfeeding


Gestational diabetic with well controlled sugars on oral medication


-------------------------------------------------------------


19


PP day 1


Hgb 12.7


Pain controlled


Breastfeeding going very well





- Plan


Plan:: 





9/3/19


Assessment:


35 yo  here for planned induction at 39 weeks


GDM controlled with oral medication


Hx of prior vaginal delivery


SVE 3/70/-1





Plan:


50 mcg Cytotec was inserted vaginally for induction of labor


Anticipate 


Reassess around noon for possible AROM or another Cytotec insertion or sooner 

if needed


Plans to use Nitrous for pain control


---------------------------------------------------


9/3/19


Routine postpartum cares


Jacki kit for pain


Lactation support


Anticipate 24-48 hour stay


---------------------------------------------------


19


Continued lactation support today


Routine postpartum cares


Pt desires discharge home today, will plan for this as long as baby's bili is 

good this evening